# Patient Record
Sex: FEMALE | Race: BLACK OR AFRICAN AMERICAN | NOT HISPANIC OR LATINO | Employment: FULL TIME | URBAN - METROPOLITAN AREA
[De-identification: names, ages, dates, MRNs, and addresses within clinical notes are randomized per-mention and may not be internally consistent; named-entity substitution may affect disease eponyms.]

---

## 2018-07-23 ENCOUNTER — HOSPITAL ENCOUNTER (EMERGENCY)
Facility: HOSPITAL | Age: 23
Discharge: HOME | End: 2018-07-23
Attending: EMERGENCY MEDICINE
Payer: COMMERCIAL

## 2018-07-23 VITALS
HEART RATE: 95 BPM | RESPIRATION RATE: 16 BRPM | TEMPERATURE: 99 F | OXYGEN SATURATION: 100 % | SYSTOLIC BLOOD PRESSURE: 118 MMHG | DIASTOLIC BLOOD PRESSURE: 68 MMHG | BODY MASS INDEX: 24.75 KG/M2 | WEIGHT: 145 LBS | HEIGHT: 64 IN

## 2018-07-23 DIAGNOSIS — R50.9 ACUTE FEBRILE ILLNESS: ICD-10-CM

## 2018-07-23 DIAGNOSIS — B34.9 VIRAL SYNDROME: Primary | ICD-10-CM

## 2018-07-23 LAB
ALBUMIN SERPL BCP-MCNC: 4.3 G/DL
ALP SERPL-CCNC: 63 U/L
ALT SERPL W/O P-5'-P-CCNC: 8 U/L
ANION GAP SERPL CALC-SCNC: 11 MMOL/L
AST SERPL-CCNC: 17 U/L
B-HCG UR QL: NEGATIVE
BASOPHILS # BLD AUTO: 0.02 K/UL
BASOPHILS NFR BLD: 0.3 %
BILIRUB SERPL-MCNC: 0.3 MG/DL
BILIRUB UR QL STRIP: NEGATIVE
BUN SERPL-MCNC: 6 MG/DL
CALCIUM SERPL-MCNC: 9.8 MG/DL
CHLORIDE SERPL-SCNC: 106 MMOL/L
CLARITY UR: CLEAR
CO2 SERPL-SCNC: 19 MMOL/L
COLOR UR: YELLOW
CREAT SERPL-MCNC: 0.9 MG/DL
CTP QC/QA: YES
DIFFERENTIAL METHOD: ABNORMAL
EOSINOPHIL # BLD AUTO: 0.2 K/UL
EOSINOPHIL NFR BLD: 2.4 %
ERYTHROCYTE [DISTWIDTH] IN BLOOD BY AUTOMATED COUNT: 15.7 %
EST. GFR  (AFRICAN AMERICAN): >60 ML/MIN/1.73 M^2
EST. GFR  (NON AFRICAN AMERICAN): >60 ML/MIN/1.73 M^2
GLUCOSE SERPL-MCNC: 82 MG/DL
GLUCOSE UR QL STRIP: NEGATIVE
HCT VFR BLD AUTO: 34 %
HGB BLD-MCNC: 10.9 G/DL
HGB UR QL STRIP: NEGATIVE
KETONES UR QL STRIP: ABNORMAL
LEUKOCYTE ESTERASE UR QL STRIP: NEGATIVE
LYMPHOCYTES # BLD AUTO: 0.4 K/UL
LYMPHOCYTES NFR BLD: 5.7 %
MCH RBC QN AUTO: 22.7 PG
MCHC RBC AUTO-ENTMCNC: 32.1 G/DL
MCV RBC AUTO: 71 FL
MONOCYTES # BLD AUTO: 0.6 K/UL
MONOCYTES NFR BLD: 8.4 %
NEUTROPHILS # BLD AUTO: 6.3 K/UL
NEUTROPHILS NFR BLD: 83.2 %
NITRITE UR QL STRIP: NEGATIVE
PH UR STRIP: 7 [PH] (ref 5–8)
PLATELET # BLD AUTO: 213 K/UL
PMV BLD AUTO: 10.1 FL
POTASSIUM SERPL-SCNC: 3.6 MMOL/L
PROT SERPL-MCNC: 7.4 G/DL
PROT UR QL STRIP: NEGATIVE
RBC # BLD AUTO: 4.8 M/UL
SODIUM SERPL-SCNC: 136 MMOL/L
SP GR UR STRIP: 1.02 (ref 1–1.03)
URN SPEC COLLECT METH UR: ABNORMAL
UROBILINOGEN UR STRIP-ACNC: NEGATIVE EU/DL
WBC # BLD AUTO: 7.58 K/UL

## 2018-07-23 PROCEDURE — 81025 URINE PREGNANCY TEST: CPT | Performed by: PHYSICIAN ASSISTANT

## 2018-07-23 PROCEDURE — 80053 COMPREHEN METABOLIC PANEL: CPT

## 2018-07-23 PROCEDURE — 85025 COMPLETE CBC W/AUTO DIFF WBC: CPT

## 2018-07-23 PROCEDURE — 81003 URINALYSIS AUTO W/O SCOPE: CPT

## 2018-07-23 PROCEDURE — 99284 EMERGENCY DEPT VISIT MOD MDM: CPT | Mod: 25

## 2018-07-23 PROCEDURE — 25000003 PHARM REV CODE 250: Performed by: PHYSICIAN ASSISTANT

## 2018-07-23 PROCEDURE — 96361 HYDRATE IV INFUSION ADD-ON: CPT

## 2018-07-23 PROCEDURE — 87040 BLOOD CULTURE FOR BACTERIA: CPT

## 2018-07-23 PROCEDURE — 63600175 PHARM REV CODE 636 W HCPCS: Performed by: PHYSICIAN ASSISTANT

## 2018-07-23 PROCEDURE — 96374 THER/PROPH/DIAG INJ IV PUSH: CPT

## 2018-07-23 RX ORDER — KETOROLAC TROMETHAMINE 30 MG/ML
15 INJECTION, SOLUTION INTRAMUSCULAR; INTRAVENOUS
Status: COMPLETED | OUTPATIENT
Start: 2018-07-23 | End: 2018-07-23

## 2018-07-23 RX ORDER — ACETAMINOPHEN 500 MG
1000 TABLET ORAL
Status: COMPLETED | OUTPATIENT
Start: 2018-07-23 | End: 2018-07-23

## 2018-07-23 RX ORDER — FLUTICASONE PROPIONATE 50 MCG
1 SPRAY, SUSPENSION (ML) NASAL 2 TIMES DAILY PRN
Qty: 15 G | Refills: 0 | Status: SHIPPED | OUTPATIENT
Start: 2018-07-23 | End: 2018-10-08

## 2018-07-23 RX ORDER — IBUPROFEN 600 MG/1
600 TABLET ORAL EVERY 6 HOURS PRN
Qty: 20 TABLET | Refills: 0 | Status: SHIPPED | OUTPATIENT
Start: 2018-07-23 | End: 2018-10-08

## 2018-07-23 RX ORDER — TRAMADOL HYDROCHLORIDE 50 MG/1
100 TABLET ORAL
Status: COMPLETED | OUTPATIENT
Start: 2018-07-23 | End: 2018-07-23

## 2018-07-23 RX ADMIN — SODIUM CHLORIDE 1000 ML: 0.9 INJECTION, SOLUTION INTRAVENOUS at 02:07

## 2018-07-23 RX ADMIN — KETOROLAC TROMETHAMINE 15 MG: 30 INJECTION, SOLUTION INTRAMUSCULAR; INTRAVENOUS at 04:07

## 2018-07-23 RX ADMIN — ACETAMINOPHEN 1000 MG: 500 TABLET, FILM COATED ORAL at 02:07

## 2018-07-23 RX ADMIN — TRAMADOL HYDROCHLORIDE 100 MG: 50 TABLET, FILM COATED ORAL at 05:07

## 2018-07-23 NOTE — ED PROVIDER NOTES
"Encounter Date: 7/23/2018    SCRIBE #1 NOTE: I, Wendy Tejada, am scribing for, and in the presence of,  Jordon Lopez PA-C. I have scribed the following portions of the note - Other sections scribed: HPI and ROS.       History     Chief Complaint   Patient presents with    Headache     reports headache that began on yesterday, pt stated "I feel like my brain is throbbing." pt stated that the pain runs down her spine and makes her legs numb     CC: Headache    HPI: This 23 y.o female presents to the ED for an evaluation of acute, constant, 10/10 "throbbing" frontal headache that began yesterday.  Patient also reports of back pain, chills, lightheadedness, sore throat, bilateral watery eyes, and a slight cough. Patient reports today, she began having numbness to her bilateral legs. Patient reports within the past 10 minutes, she began having episodes of uncontrollable twitching.  Patient denies fever, chills, nausea, emesis, diarrhea, abdominal pain, chest pain, extremity weakness, rash, or any other associated symptoms.  No prior tx.  No alleviating factors.  No known sick contacts.          The history is provided by the patient. No  was used.     Review of patient's allergies indicates:   Allergen Reactions    Shrimp Other (See Comments)     patient reports itching to throat and tongue     History reviewed. No pertinent past medical history.  History reviewed. No pertinent surgical history.  History reviewed. No pertinent family history.  Social History   Substance Use Topics    Smoking status: Never Smoker    Smokeless tobacco: Not on file    Alcohol use No     Review of Systems   Constitutional: Positive for chills. Negative for fever.   HENT: Positive for sore throat. Negative for ear pain.    Eyes: Negative for pain.        (+) watery eyes   Respiratory: Positive for cough. Negative for shortness of breath.    Cardiovascular: Negative for chest pain.   Gastrointestinal: Negative for " abdominal pain, diarrhea, nausea and vomiting.   Genitourinary: Negative for dysuria.   Musculoskeletal: Positive for back pain.   Skin: Negative for rash.   Neurological: Positive for light-headedness, numbness and headaches.       Physical Exam     Initial Vitals [07/23/18 1250]   BP Pulse Resp Temp SpO2   134/89 (!) 117 19 (!) 100.5 °F (38.1 °C) 96 %      MAP       --         Physical Exam    Vitals reviewed.  Constitutional: She appears well-developed and well-nourished. She is not diaphoretic. No distress.   HENT:   Head: Normocephalic and atraumatic.   Right Ear: Tympanic membrane and external ear normal. No mastoid tenderness.   Left Ear: Tympanic membrane and external ear normal. No mastoid tenderness.   Nose: Mucosal edema present. No rhinorrhea. Right sinus exhibits no maxillary sinus tenderness and no frontal sinus tenderness. Left sinus exhibits no maxillary sinus tenderness and no frontal sinus tenderness.   Mouth/Throat: Uvula is midline, oropharynx is clear and moist and mucous membranes are normal.   Eyes: Conjunctivae and EOM are normal. Pupils are equal, round, and reactive to light. No scleral icterus.   Neck: Normal range of motion and full passive range of motion without pain. Neck supple. No Brudzinski's sign and no Kernig's sign noted.   Cardiovascular: Normal rate, regular rhythm, normal heart sounds and intact distal pulses.   Pulmonary/Chest: Breath sounds normal. No respiratory distress. She has no wheezes. She has no rhonchi. She has no rales. She exhibits no tenderness.   Abdominal: Soft. She exhibits no distension and no mass. There is no tenderness. There is no rebound and no guarding.   Musculoskeletal: Normal range of motion.   Lymphadenopathy:     She has no cervical adenopathy.   Neurological: She is alert and oriented to person, place, and time. She has normal strength and normal reflexes. No cranial nerve deficit or sensory deficit.   Bilateral arms with involuntary  twitching/jerking movements occurring once every 3-5 seconds   Skin: Skin is warm and dry. Capillary refill takes less than 2 seconds. No rash noted. No erythema.         ED Course   Procedures  Labs Reviewed   URINALYSIS, REFLEX TO URINE CULTURE - Abnormal; Notable for the following:        Result Value    Ketones, UA 1+ (*)     All other components within normal limits    Narrative:     Preferred Collection Type->Urine, Clean Catch   CBC W/ AUTO DIFFERENTIAL - Abnormal; Notable for the following:     Hemoglobin 10.9 (*)     Hematocrit 34.0 (*)     MCV 71 (*)     MCH 22.7 (*)     RDW 15.7 (*)     Lymph # 0.4 (*)     Gran% 83.2 (*)     Lymph% 5.7 (*)     All other components within normal limits   COMPREHENSIVE METABOLIC PANEL - Abnormal; Notable for the following:     CO2 19 (*)     ALT 8 (*)     All other components within normal limits   CULTURE, BLOOD   CULTURE, BLOOD   POCT URINE PREGNANCY          Imaging Results          CT Head Without Contrast (Final result)  Result time 07/23/18 15:38:17    Final result by Renaldo Juárez MD (07/23/18 15:38:17)                 Impression:      Normal noncontrast head CT.      Electronically signed by: Renaldo Juárez MD  Date:    07/23/2018  Time:    15:38             Narrative:    EXAMINATION:  CT HEAD WITHOUT CONTRAST    CLINICAL HISTORY:  headache with fever;    TECHNIQUE:  Low dose axial CT images obtained throughout the head without intravenous contrast. Sagittal and coronal reconstructions were performed.    COMPARISON:  None.    FINDINGS:  Intracranial compartment:    Ventricles and sulci are normal in size for age without evidence of hydrocephalus. No extra-axial blood or fluid collections.    The brain parenchyma appears normal. No parenchymal mass, hemorrhage, edema or major vascular distribution infarct.    Skull/extracranial contents (limited evaluation): No fracture. Mastoid air cells and paranasal sinuses are essentially clear.  Visualized portions of the orbits  are within normal limits.                              X-Rays:   Independently Interpreted Readings:   Head CT: No hemorrhage.  No acute stroke.     Medical Decision Making:   ED Management:  Pt with headache, fever, and lower back pain. Neuro exam without focal weakness. She had no changes in mental status. Neck is without meningeal signs and headache is frontal only. Recent URI symptoms preceded headache. CT negative for obvious acute process. Labs are reassuring. UA without infection.  I considered further evaluation with LP, but have low suspicion for meningitis. I discussed the case with Dr. Moon who agrees meningitis is unlikely. No evidence of other serious infection or sepsis. Pt with improved symptoms with IV fluids and tylenol. Pt safe for discharge, and was given strict return precautions, and PCP f/u information for close f/u. She is comfortable with the plan.             Scribe Attestation:   Scribe #1: I performed the above scribed service and the documentation accurately describes the services I performed. I attest to the accuracy of the note.    Attending Attestation:           Physician Attestation for Scribe:  Physician Attestation Statement for Scribe #1: I, Jordon Lopez PA-C, reviewed documentation, as scribed by Wendy Tejada in my presence, and it is both accurate and complete.                    Clinical Impression:   The primary encounter diagnosis was Viral syndrome. A diagnosis of Acute febrile illness was also pertinent to this visit.                             Jordon Lopez PA-C  07/25/18 0032

## 2018-07-23 NOTE — ED NOTES
Pt. Calm and on the phone- pt. States she feel better however she cont to have a headache. Pt. States her pain is 7/10.

## 2018-07-23 NOTE — ED TRIAGE NOTES
Patient presents to the ED via personal vehicle with boyfriend. Patient reports headache x 1 hour, bilateral leg numbness x 1 hour, spastic movements to upper body and arms x 1 hour, and lower and back pain x 1 hour. Patient denies blurred vision, difficulty speaking, tingling to extremities, weakness to extremities, facial drooping.Denies fever, chills.

## 2018-07-28 LAB
BACTERIA BLD CULT: NORMAL
BACTERIA BLD CULT: NORMAL

## 2018-09-07 ENCOUNTER — OFFICE VISIT (OUTPATIENT)
Dept: FAMILY MEDICINE | Facility: CLINIC | Age: 23
End: 2018-09-07
Payer: OTHER GOVERNMENT

## 2018-09-07 ENCOUNTER — LAB VISIT (OUTPATIENT)
Dept: LAB | Facility: HOSPITAL | Age: 23
End: 2018-09-07
Attending: FAMILY MEDICINE
Payer: OTHER GOVERNMENT

## 2018-09-07 VITALS
DIASTOLIC BLOOD PRESSURE: 58 MMHG | HEIGHT: 64 IN | WEIGHT: 146 LBS | HEART RATE: 67 BPM | OXYGEN SATURATION: 99 % | BODY MASS INDEX: 24.92 KG/M2 | TEMPERATURE: 98 F | RESPIRATION RATE: 18 BRPM | SYSTOLIC BLOOD PRESSURE: 100 MMHG

## 2018-09-07 DIAGNOSIS — Z86.2 HX OF IRON DEFICIENCY ANEMIA: ICD-10-CM

## 2018-09-07 DIAGNOSIS — F32.1 CURRENT MODERATE EPISODE OF MAJOR DEPRESSIVE DISORDER WITHOUT PRIOR EPISODE: Primary | ICD-10-CM

## 2018-09-07 LAB
ALBUMIN SERPL BCP-MCNC: 4.2 G/DL
ALP SERPL-CCNC: 75 U/L
ALT SERPL W/O P-5'-P-CCNC: 7 U/L
ANION GAP SERPL CALC-SCNC: 8 MMOL/L
AST SERPL-CCNC: 19 U/L
BASOPHILS # BLD AUTO: 0.05 K/UL
BASOPHILS NFR BLD: 0.6 %
BILIRUB SERPL-MCNC: 0.5 MG/DL
BUN SERPL-MCNC: 8 MG/DL
CALCIUM SERPL-MCNC: 9.9 MG/DL
CHLORIDE SERPL-SCNC: 107 MMOL/L
CHOLEST SERPL-MCNC: 141 MG/DL
CHOLEST/HDLC SERPL: 2 {RATIO}
CO2 SERPL-SCNC: 24 MMOL/L
CREAT SERPL-MCNC: 0.8 MG/DL
DIFFERENTIAL METHOD: ABNORMAL
EOSINOPHIL # BLD AUTO: 0.1 K/UL
EOSINOPHIL NFR BLD: 1.7 %
ERYTHROCYTE [DISTWIDTH] IN BLOOD BY AUTOMATED COUNT: 16.3 %
EST. GFR  (AFRICAN AMERICAN): >60 ML/MIN/1.73 M^2
EST. GFR  (NON AFRICAN AMERICAN): >60 ML/MIN/1.73 M^2
FERRITIN SERPL-MCNC: 5 NG/ML
GLUCOSE SERPL-MCNC: 74 MG/DL
HCT VFR BLD AUTO: 31.4 %
HDLC SERPL-MCNC: 70 MG/DL
HDLC SERPL: 49.6 %
HGB BLD-MCNC: 9.4 G/DL
IMM GRANULOCYTES # BLD AUTO: 0.02 K/UL
IMM GRANULOCYTES NFR BLD AUTO: 0.3 %
IRON SERPL-MCNC: 18 UG/DL
LDLC SERPL CALC-MCNC: 63.8 MG/DL
LYMPHOCYTES # BLD AUTO: 1.6 K/UL
LYMPHOCYTES NFR BLD: 20.9 %
MCH RBC QN AUTO: 21.4 PG
MCHC RBC AUTO-ENTMCNC: 29.9 G/DL
MCV RBC AUTO: 71 FL
MONOCYTES # BLD AUTO: 0.5 K/UL
MONOCYTES NFR BLD: 6.9 %
NEUTROPHILS # BLD AUTO: 5.4 K/UL
NEUTROPHILS NFR BLD: 69.6 %
NONHDLC SERPL-MCNC: 71 MG/DL
NRBC BLD-RTO: 0 /100 WBC
PLATELET # BLD AUTO: 331 K/UL
PMV BLD AUTO: 12.1 FL
POTASSIUM SERPL-SCNC: 4 MMOL/L
PROT SERPL-MCNC: 7.4 G/DL
RBC # BLD AUTO: 4.4 M/UL
SATURATED IRON: 3 %
SODIUM SERPL-SCNC: 139 MMOL/L
TOTAL IRON BINDING CAPACITY: 565 UG/DL
TRANSFERRIN SERPL-MCNC: 382 MG/DL
TRIGL SERPL-MCNC: 36 MG/DL
WBC # BLD AUTO: 7.81 K/UL

## 2018-09-07 PROCEDURE — 80061 LIPID PANEL: CPT

## 2018-09-07 PROCEDURE — 82728 ASSAY OF FERRITIN: CPT

## 2018-09-07 PROCEDURE — 80053 COMPREHEN METABOLIC PANEL: CPT

## 2018-09-07 PROCEDURE — 85025 COMPLETE CBC W/AUTO DIFF WBC: CPT

## 2018-09-07 PROCEDURE — 83540 ASSAY OF IRON: CPT

## 2018-09-07 PROCEDURE — 36415 COLL VENOUS BLD VENIPUNCTURE: CPT | Mod: PO

## 2018-09-07 PROCEDURE — 99999 PR PBB SHADOW E&M-EST. PATIENT-LVL III: CPT | Mod: PBBFAC,,, | Performed by: FAMILY MEDICINE

## 2018-09-07 PROCEDURE — 99204 OFFICE O/P NEW MOD 45 MIN: CPT | Mod: S$PBB,,, | Performed by: FAMILY MEDICINE

## 2018-09-07 PROCEDURE — 99213 OFFICE O/P EST LOW 20 MIN: CPT | Mod: PBBFAC,PO | Performed by: FAMILY MEDICINE

## 2018-09-07 RX ORDER — SERTRALINE HYDROCHLORIDE 25 MG/1
25 TABLET, FILM COATED ORAL DAILY
Qty: 30 TABLET | Refills: 2 | Status: SHIPPED | OUTPATIENT
Start: 2018-09-07 | End: 2018-10-08

## 2018-09-07 NOTE — PROGRESS NOTES
Chief Complaint   Patient presents with    Establish Care    Depression       HPI  Cuong Aguayo is a 23 y.o. female with multiple medical diagnoses as listed in the medical history and problem list that presents for establishment of care . She has a hx of depression, diagnosed two years ago and has been going to therapy but stopped in May. She has been having depression for some time now but has had worsening symptoms since starting pharmacy school. She has had crying spells but these have improved. She has frequent thoughts of death and wishing God would come back. She has spiritual support with her mother but does not feel motivated to read scriptures. She has low energy and feels tired all the time but is not sure if this is due to her low iron. She has five day periods with clots.     Of note her father has bipolar d/o and is not on medication. She gets four to six hours of sleep nightly when she is not studying for a test, but at times sleeping is difficult and she feels restless.     PAST MEDICAL HISTORY:  Past Medical History:   Diagnosis Date    Depression     Iron deficiency anemia        PAST SURGICAL HISTORY:  No past surgical history on file.    SOCIAL HISTORY:  Social History     Socioeconomic History    Marital status: Single     Spouse name: Not on file    Number of children: Not on file    Years of education: Not on file    Highest education level: Not on file   Social Needs    Financial resource strain: Not on file    Food insecurity - worry: Not on file    Food insecurity - inability: Not on file    Transportation needs - medical: Not on file    Transportation needs - non-medical: Not on file   Occupational History    Not on file   Tobacco Use    Smoking status: Never Smoker    Smokeless tobacco: Never Used   Substance and Sexual Activity    Alcohol use: No    Drug use: No    Sexual activity: No   Other Topics Concern    Not on file   Social History Narrative    Not on file        FAMILY HISTORY:  Family History   Problem Relation Age of Onset    Bipolar disorder Father     Kidney failure Father         dialysis    Diabetes type II Father     Hypertension Father        ALLERGIES AND MEDICATIONS: updated and reviewed.  Review of patient's allergies indicates:   Allergen Reactions    Shrimp Other (See Comments)     patient reports itching to throat and tongue     Current Outpatient Medications   Medication Sig Dispense Refill    fluticasone (FLONASE) 50 mcg/actuation nasal spray 1 spray (50 mcg total) by Each Nare route 2 (two) times daily as needed for Rhinitis. 15 g 0    hydrocortisone 1 % cream Apply to affected area 2 times daily 30 g 0    ibuprofen (ADVIL,MOTRIN) 600 MG tablet Take 1 tablet (600 mg total) by mouth every 6 (six) hours as needed for Pain. 20 tablet 0    sertraline (ZOLOFT) 25 MG tablet Take 1 tablet (25 mg total) by mouth once daily. 30 tablet 2     No current facility-administered medications for this visit.        ROS  Review of Systems   Constitutional: Positive for fatigue. Negative for chills, diaphoresis, fever and unexpected weight change.   HENT: Negative for rhinorrhea, sinus pressure, sore throat and tinnitus.    Eyes: Negative for photophobia and visual disturbance.   Respiratory: Negative for cough, shortness of breath and wheezing.    Cardiovascular: Negative for chest pain and palpitations.   Gastrointestinal: Negative for abdominal pain, blood in stool, constipation, diarrhea, nausea and vomiting.   Genitourinary: Negative for dysuria, flank pain, frequency and vaginal discharge.   Musculoskeletal: Negative for arthralgias and joint swelling.   Skin: Negative for rash.   Neurological: Negative for speech difficulty, weakness, light-headedness and headaches.   Psychiatric/Behavioral: Positive for dysphoric mood. Negative for behavioral problems.       Physical Exam  Vitals:    09/07/18 0958   BP: (!) 100/58   Pulse: 67   Resp: 18   Temp: 98.3 °F  "(36.8 °C)   TempSrc: Oral   SpO2: 99%   Weight: 66.2 kg (146 lb)   Height: 5' 4" (1.626 m)    Body mass index is 25.06 kg/m².  Weight: 66.2 kg (146 lb)   Height: 5' 4" (162.6 cm)     Physical Exam   Constitutional: She is oriented to person, place, and time. She appears well-developed and well-nourished. No distress.   HENT:   Head: Normocephalic and atraumatic.   Eyes: EOM are normal.   Neck: Neck supple.   Cardiovascular: Normal rate and regular rhythm. Exam reveals no gallop and no friction rub.   No murmur heard.  Pulmonary/Chest: Effort normal and breath sounds normal. No respiratory distress. She has no wheezes. She has no rales.   Lymphadenopathy:     She has no cervical adenopathy.   Neurological: She is alert and oriented to person, place, and time.   Skin: Skin is warm and dry. No rash noted.   Psychiatric: She has a normal mood and affect. Her behavior is normal.   Nursing note and vitals reviewed.      Health Maintenance    Patient has no pending health maintenance at this time           ASSESSMENT     1. Current moderate episode of major depressive disorder without prior episode    2. Hx of iron deficiency anemia        PLAN:     Problem List Items Addressed This Visit        Other    Current moderate episode of major depressive disorder without prior episode - Primary  -begin zoloft 25mg, half pill for three nights then increase to a whole  -Discussed common side effects including drowsiness and upset stomach, along with any black box warnings if applicable. This drug can alter sleep patterns and mood and the patient will notify me if changes occur.   -will try this first, if she has more restlessness or more trouble sleeping, psych consult for mood d/o eval  -f/o in one month    Relevant Medications    sertraline (ZOLOFT) 25 MG tablet      Other Visit Diagnoses     Hx of iron deficiency anemia      -check iron levels    Relevant Orders    CBC auto differential    Comprehensive metabolic panel    Lipid " panel    Iron and TIBC    Ferritin            Giana Cao MD  09/07/2018 10:48 AM        Follow-up in about 1 month (around 10/7/2018).

## 2018-09-14 ENCOUNTER — TELEPHONE (OUTPATIENT)
Dept: FAMILY MEDICINE | Facility: CLINIC | Age: 23
End: 2018-09-14

## 2018-09-20 ENCOUNTER — PATIENT MESSAGE (OUTPATIENT)
Dept: FAMILY MEDICINE | Facility: CLINIC | Age: 23
End: 2018-09-20

## 2018-10-08 ENCOUNTER — OFFICE VISIT (OUTPATIENT)
Dept: FAMILY MEDICINE | Facility: CLINIC | Age: 23
End: 2018-10-08
Payer: OTHER GOVERNMENT

## 2018-10-08 ENCOUNTER — PATIENT MESSAGE (OUTPATIENT)
Dept: FAMILY MEDICINE | Facility: CLINIC | Age: 23
End: 2018-10-08

## 2018-10-08 VITALS
WEIGHT: 142 LBS | TEMPERATURE: 99 F | BODY MASS INDEX: 24.24 KG/M2 | HEART RATE: 67 BPM | OXYGEN SATURATION: 99 % | HEIGHT: 64 IN | SYSTOLIC BLOOD PRESSURE: 118 MMHG | RESPIRATION RATE: 18 BRPM | DIASTOLIC BLOOD PRESSURE: 76 MMHG

## 2018-10-08 DIAGNOSIS — D50.0 IRON DEFICIENCY ANEMIA DUE TO CHRONIC BLOOD LOSS: ICD-10-CM

## 2018-10-08 DIAGNOSIS — Z23 NEED FOR IMMUNIZATION AGAINST INFLUENZA: ICD-10-CM

## 2018-10-08 DIAGNOSIS — R25.1 TREMOR: ICD-10-CM

## 2018-10-08 DIAGNOSIS — F32.1 CURRENT MODERATE EPISODE OF MAJOR DEPRESSIVE DISORDER WITHOUT PRIOR EPISODE: Primary | ICD-10-CM

## 2018-10-08 PROBLEM — D50.9 IRON DEFICIENCY ANEMIA: Status: ACTIVE | Noted: 2018-10-08

## 2018-10-08 PROCEDURE — 90686 IIV4 VACC NO PRSV 0.5 ML IM: CPT | Mod: PBBFAC,PO | Performed by: FAMILY MEDICINE

## 2018-10-08 PROCEDURE — 99999 PR PBB SHADOW E&M-EST. PATIENT-LVL III: CPT | Mod: PBBFAC,,, | Performed by: FAMILY MEDICINE

## 2018-10-08 PROCEDURE — 99214 OFFICE O/P EST MOD 30 MIN: CPT | Mod: S$GLB,,, | Performed by: FAMILY MEDICINE

## 2018-10-08 PROCEDURE — 99213 OFFICE O/P EST LOW 20 MIN: CPT | Mod: PBBFAC,PO,25 | Performed by: FAMILY MEDICINE

## 2018-10-08 RX ORDER — BUPROPION HYDROCHLORIDE 75 MG/1
75 TABLET ORAL 2 TIMES DAILY
Qty: 60 TABLET | Refills: 2 | Status: SHIPPED | OUTPATIENT
Start: 2018-10-08 | End: 2018-11-16

## 2018-10-08 NOTE — PROGRESS NOTES
Chief Complaint   Patient presents with    Depression    Follow-up       HPI  Cuong Aguayo is a 23 y.o. female with multiple medical diagnoses as listed in the medical history and problem list that presents for follow-up depression and a tremor.    Depression- has had some improvement in her sleeping patterns becoming more regular. She has not felt as depressed but still has had low motivation other than doing what she has to do for school. She has felt more anxious though and had a panic attack during one of her tests    Tremor- she has noted this since she started the zoloft, consistently during the morning when she wakes up but also throughout the day    PAST MEDICAL HISTORY:  Past Medical History:   Diagnosis Date    Depression     Iron deficiency anemia        PAST SURGICAL HISTORY:  No past surgical history on file.    SOCIAL HISTORY:  Social History     Socioeconomic History    Marital status: Single     Spouse name: Not on file    Number of children: Not on file    Years of education: Not on file    Highest education level: Not on file   Social Needs    Financial resource strain: Not on file    Food insecurity - worry: Not on file    Food insecurity - inability: Not on file    Transportation needs - medical: Not on file    Transportation needs - non-medical: Not on file   Occupational History    Not on file   Tobacco Use    Smoking status: Never Smoker    Smokeless tobacco: Never Used   Substance and Sexual Activity    Alcohol use: No    Drug use: No    Sexual activity: No   Other Topics Concern    Not on file   Social History Narrative    Not on file       FAMILY HISTORY:  Family History   Problem Relation Age of Onset    Bipolar disorder Father     Kidney failure Father         dialysis    Diabetes type II Father     Hypertension Father        ALLERGIES AND MEDICATIONS: updated and reviewed.  Review of patient's allergies indicates:   Allergen Reactions    Shrimp Other (See  "Comments)     patient reports itching to throat and tongue     Current Outpatient Medications   Medication Sig Dispense Refill    buPROPion (WELLBUTRIN) 75 MG tablet Take 1 tablet (75 mg total) by mouth 2 (two) times daily. 60 tablet 2     No current facility-administered medications for this visit.        ROS  Review of Systems   Constitutional: Positive for fatigue. Negative for chills, diaphoresis, fever and unexpected weight change.   HENT: Negative for rhinorrhea, sinus pressure, sore throat and tinnitus.    Eyes: Negative for photophobia and visual disturbance.   Respiratory: Negative for cough, shortness of breath and wheezing.    Cardiovascular: Negative for chest pain and palpitations.   Gastrointestinal: Negative for abdominal pain, blood in stool, constipation, diarrhea, nausea and vomiting.   Genitourinary: Negative for dysuria, flank pain, frequency and vaginal discharge.   Musculoskeletal: Negative for arthralgias and joint swelling.   Skin: Negative for rash.   Neurological: Positive for tremors. Negative for speech difficulty, weakness, light-headedness and headaches.   Psychiatric/Behavioral: Positive for dysphoric mood. Negative for behavioral problems.       Physical Exam  Vitals:    10/08/18 1026   BP: 118/76   Pulse: 67   Resp: 18   Temp: 98.6 °F (37 °C)   TempSrc: Oral   SpO2: 99%   Weight: 64.4 kg (142 lb)   Height: 5' 4" (1.626 m)    Body mass index is 24.37 kg/m².  Weight: 64.4 kg (142 lb)   Height: 5' 4" (162.6 cm)     Physical Exam   Constitutional: She is oriented to person, place, and time. She appears well-developed and well-nourished.   Eyes: EOM are normal.   Neurological: She is alert and oriented to person, place, and time.   No intention tremor seen with finger nose, no resting tremor noted   Skin: Skin is warm and dry. No rash noted. No erythema.   Psychiatric: She has a normal mood and affect. Her behavior is normal.   Nursing note and vitals reviewed.      Health Maintenance  "      Date Due Completion Date    TETANUS VACCINE 07/05/2013 ---    Pap Smear 07/05/2016 ---    Influenza Vaccine 08/01/2018 ---            ASSESSMENT     1. Current moderate episode of major depressive disorder without prior episode    2. Iron deficiency anemia due to chronic blood loss    3. Need for immunization against influenza    4. Tremor        PLAN:     Problem List Items Addressed This Visit        Oncology    Iron deficiency anemia  Begin iron replacement daily to BID with stool softener, she does have heavy periods, will recheck levels in 3 mos       Other    Current moderate episode of major depressive disorder without prior episode - Primary  -had a lengthy discussion regarding switching the medication, will stop zoloft because her tremor is likely a side effect and I don't want it to persist  -one pill daily for one week of the wellbutrin, which she may start tomorrow    Relevant Medications    buPROPion (WELLBUTRIN) 75 MG tablet      Other Visit Diagnoses     Need for immunization against influenza        Relevant Orders    Influenza - Quadrivalent (3 years & older) (PF)    Tremor      -d/c zoloft, will try wellbutrin, explained that this may take time to resolve but can persist in some cases            iGana Cao MD  10/08/2018 10:49 AM        Follow-up in about 1 month (around 11/8/2018) for Follow up.

## 2018-10-08 NOTE — PATIENT INSTRUCTIONS
I recommend you begin with fergon (you may get this over the counter) at least once daily for the first week, then increase to twice daily the next week and finally three times daily. Please take with a stool softener such as colace to avoid constipation. Let me know if you are unable to do this.

## 2018-11-06 ENCOUNTER — OFFICE VISIT (OUTPATIENT)
Dept: URGENT CARE | Facility: CLINIC | Age: 23
End: 2018-11-06
Payer: OTHER GOVERNMENT

## 2018-11-06 VITALS
DIASTOLIC BLOOD PRESSURE: 89 MMHG | OXYGEN SATURATION: 99 % | BODY MASS INDEX: 23.73 KG/M2 | WEIGHT: 139 LBS | SYSTOLIC BLOOD PRESSURE: 123 MMHG | HEIGHT: 64 IN | HEART RATE: 64 BPM | TEMPERATURE: 98 F | RESPIRATION RATE: 12 BRPM

## 2018-11-06 DIAGNOSIS — M62.838 CERVICAL PARASPINOUS MUSCLE SPASM: ICD-10-CM

## 2018-11-06 DIAGNOSIS — S16.1XXA STRAIN OF NECK MUSCLE, INITIAL ENCOUNTER: Primary | ICD-10-CM
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
## 2018-11-06 DIAGNOSIS — M54.50 ACUTE BILATERAL LOW BACK PAIN WITHOUT SCIATICA: ICD-10-CM

## 2018-11-06 PROCEDURE — 99214 OFFICE O/P EST MOD 30 MIN: CPT | Mod: S$GLB,,, | Performed by: NURSE PRACTITIONER

## 2018-11-06 RX ORDER — TIZANIDINE 4 MG/1
4 TABLET ORAL DAILY
Qty: 14 TABLET | Refills: 0 | Status: SHIPPED | OUTPATIENT
Start: 2018-11-06 | End: 2018-11-20

## 2018-11-06 RX ORDER — NAPROXEN 500 MG/1
500 TABLET ORAL 2 TIMES DAILY WITH MEALS
Qty: 20 TABLET | Refills: 0 | Status: SHIPPED | OUTPATIENT
Start: 2018-11-06 | End: 2018-11-16
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
## 2018-11-14 ENCOUNTER — PATIENT MESSAGE (OUTPATIENT)
Dept: FAMILY MEDICINE | Facility: CLINIC | Age: 23
End: 2018-11-14

## 2018-11-16 ENCOUNTER — PATIENT OUTREACH (OUTPATIENT)
Dept: ADMINISTRATIVE | Facility: HOSPITAL | Age: 23
End: 2018-11-16

## 2018-11-16 ENCOUNTER — OFFICE VISIT (OUTPATIENT)
Dept: FAMILY MEDICINE | Facility: CLINIC | Age: 23
End: 2018-11-16
Payer: OTHER GOVERNMENT

## 2018-11-16 VITALS
TEMPERATURE: 99 F | SYSTOLIC BLOOD PRESSURE: 100 MMHG | HEIGHT: 64 IN | WEIGHT: 144 LBS | DIASTOLIC BLOOD PRESSURE: 68 MMHG | RESPIRATION RATE: 18 BRPM | OXYGEN SATURATION: 99 % | BODY MASS INDEX: 24.59 KG/M2 | HEART RATE: 59 BPM

## 2018-11-16 DIAGNOSIS — F32.1 CURRENT MODERATE EPISODE OF MAJOR DEPRESSIVE DISORDER WITHOUT PRIOR EPISODE: Primary | ICD-10-CM

## 2018-11-16 PROCEDURE — 99999 PR PBB SHADOW E&M-EST. PATIENT-LVL III: CPT | Mod: PBBFAC,,, | Performed by: FAMILY MEDICINE

## 2018-11-16 PROCEDURE — 99213 OFFICE O/P EST LOW 20 MIN: CPT | Mod: PBBFAC,PO | Performed by: FAMILY MEDICINE

## 2018-11-16 PROCEDURE — 99214 OFFICE O/P EST MOD 30 MIN: CPT | Mod: S$PBB,,, | Performed by: FAMILY MEDICINE

## 2018-11-16 RX ORDER — ESCITALOPRAM OXALATE 5 MG/1
5 TABLET ORAL DAILY
Qty: 30 TABLET | Refills: 1 | Status: SHIPPED | OUTPATIENT
Start: 2018-11-16 | End: 2020-02-11

## 2018-11-16 NOTE — PROGRESS NOTES
Chief Complaint   Patient presents with    Depression       HPI  Cuong Aguayo is a 23 y.o. female with multiple medical diagnoses as listed in the medical history and problem list that presents for follow-up for depression. She reports the wellbutrin causes her to feel sluggish and does not help her moods as much as the zoloft did. She is also struggling with her mother's recent ovarian cancer diagnosis. She has been unmotivated to study. She is struggling to pay attention in class. She has failed her recent round of tests. She is not having thoughts of harm.     PAST MEDICAL HISTORY:  Past Medical History:   Diagnosis Date    Depression     Iron deficiency anemia        PAST SURGICAL HISTORY:  History reviewed. No pertinent surgical history.    SOCIAL HISTORY:  Social History     Socioeconomic History    Marital status: Single     Spouse name: Not on file    Number of children: Not on file    Years of education: Not on file    Highest education level: Not on file   Social Needs    Financial resource strain: Not on file    Food insecurity - worry: Not on file    Food insecurity - inability: Not on file    Transportation needs - medical: Not on file    Transportation needs - non-medical: Not on file   Occupational History    Not on file   Tobacco Use    Smoking status: Never Smoker    Smokeless tobacco: Never Used   Substance and Sexual Activity    Alcohol use: No    Drug use: No    Sexual activity: No   Other Topics Concern    Not on file   Social History Narrative    Not on file       FAMILY HISTORY:  Family History   Problem Relation Age of Onset    Bipolar disorder Father     Kidney failure Father         dialysis    Diabetes type II Father     Hypertension Father        ALLERGIES AND MEDICATIONS: updated and reviewed.  Review of patient's allergies indicates:   Allergen Reactions    Shrimp Other (See Comments)     patient reports itching to throat and tongue     Current Outpatient  "Medications   Medication Sig Dispense Refill    escitalopram oxalate (LEXAPRO) 5 MG Tab Take 1 tablet (5 mg total) by mouth once daily. 30 tablet 1    naproxen (NAPROSYN) 500 MG tablet Take 1 tablet (500 mg total) by mouth 2 (two) times daily with meals. for 10 days 20 tablet 0    tiZANidine (ZANAFLEX) 4 MG tablet Take 1 tablet (4 mg total) by mouth once daily. for 14 days 14 tablet 0     No current facility-administered medications for this visit.        ROS  Review of Systems   Constitutional: Negative for activity change, chills, diaphoresis, fatigue, fever and unexpected weight change.   HENT: Negative for hearing loss, rhinorrhea, sinus pressure, sore throat, tinnitus and trouble swallowing.    Eyes: Negative for photophobia, discharge and visual disturbance.   Respiratory: Negative for cough, chest tightness, shortness of breath and wheezing.    Cardiovascular: Negative for chest pain and palpitations.   Gastrointestinal: Negative for abdominal pain, blood in stool, constipation, diarrhea, nausea and vomiting.   Endocrine: Negative for polydipsia and polyuria.   Genitourinary: Negative for difficulty urinating, dysuria, flank pain, frequency, hematuria, menstrual problem and vaginal discharge.   Musculoskeletal: Negative for arthralgias, joint swelling and neck pain.   Skin: Negative for rash.   Neurological: Negative for speech difficulty, weakness, light-headedness and headaches.   Psychiatric/Behavioral: Positive for dysphoric mood. Negative for behavioral problems and confusion.       Physical Exam  Vitals:    11/16/18 0713   BP: 100/68   Pulse: (!) 59   Resp: 18   Temp: 98.7 °F (37.1 °C)   TempSrc: Oral   SpO2: 99%   Weight: 65.3 kg (144 lb)   Height: 5' 4" (1.626 m)    Body mass index is 24.72 kg/m².  Weight: 65.3 kg (144 lb)   Height: 5' 4" (162.6 cm)     Physical Exam   Constitutional: She is oriented to person, place, and time. She appears well-developed and well-nourished.   Eyes: EOM are normal. "   Neurological: She is alert and oriented to person, place, and time.   Skin: Skin is warm and dry. No rash noted. No erythema.   Psychiatric: Her behavior is normal.   Mood depressed, no SI/HI   Nursing note and vitals reviewed.      Health Maintenance       Date Due Completion Date    Pap Smear 07/05/2016 ---    TETANUS VACCINE 06/22/2026 6/22/2016            ASSESSMENT     1. Current moderate episode of major depressive disorder without prior episode        PLAN:     Problem List Items Addressed This Visit        Other    Current moderate episode of major depressive disorder without prior episode - Primary    Relevant Medications    escitalopram oxalate (LEXAPRO) 5 MG Tab      D/c wellbutrin, may start lexapro tonight  Discussed that she needs to resume counseling as advised on previous visits  She may need some time off to focus on her mental health      Giana Cao MD  11/16/2018 7:18 AM        Follow-up in about 2 weeks (around 11/30/2018) for Follow up.

## 2019-04-28 DIAGNOSIS — F32.1 CURRENT MODERATE EPISODE OF MAJOR DEPRESSIVE DISORDER WITHOUT PRIOR EPISODE: ICD-10-CM

## 2019-04-29 RX ORDER — ESCITALOPRAM OXALATE 5 MG/1
TABLET ORAL
Qty: 30 TABLET | Refills: 0 | OUTPATIENT
Start: 2019-04-29

## 2020-02-11 ENCOUNTER — LAB VISIT (OUTPATIENT)
Dept: LAB | Facility: HOSPITAL | Age: 25
End: 2020-02-11
Attending: FAMILY MEDICINE
Payer: OTHER GOVERNMENT

## 2020-02-11 ENCOUNTER — OFFICE VISIT (OUTPATIENT)
Dept: FAMILY MEDICINE | Facility: CLINIC | Age: 25
End: 2020-02-11
Payer: OTHER GOVERNMENT

## 2020-02-11 VITALS
HEART RATE: 81 BPM | RESPIRATION RATE: 20 BRPM | DIASTOLIC BLOOD PRESSURE: 82 MMHG | TEMPERATURE: 99 F | OXYGEN SATURATION: 100 % | WEIGHT: 150.38 LBS | BODY MASS INDEX: 25.67 KG/M2 | SYSTOLIC BLOOD PRESSURE: 126 MMHG | HEIGHT: 64 IN

## 2020-02-11 DIAGNOSIS — R07.89 CHEST TIGHTNESS: ICD-10-CM

## 2020-02-11 DIAGNOSIS — F41.9 ANXIETY: Primary | ICD-10-CM

## 2020-02-11 DIAGNOSIS — N92.0 MENORRHAGIA WITH REGULAR CYCLE: ICD-10-CM

## 2020-02-11 DIAGNOSIS — D50.0 IRON DEFICIENCY ANEMIA DUE TO CHRONIC BLOOD LOSS: ICD-10-CM

## 2020-02-11 DIAGNOSIS — F32.1 CURRENT MODERATE EPISODE OF MAJOR DEPRESSIVE DISORDER WITHOUT PRIOR EPISODE: ICD-10-CM

## 2020-02-11 DIAGNOSIS — F41.9 ANXIETY: ICD-10-CM

## 2020-02-11 LAB
ALBUMIN SERPL BCP-MCNC: 4.2 G/DL (ref 3.5–5.2)
ALP SERPL-CCNC: 66 U/L (ref 55–135)
ALT SERPL W/O P-5'-P-CCNC: 8 U/L (ref 10–44)
ANION GAP SERPL CALC-SCNC: 9 MMOL/L (ref 8–16)
AST SERPL-CCNC: 17 U/L (ref 10–40)
BASOPHILS # BLD AUTO: 0.04 K/UL (ref 0–0.2)
BASOPHILS NFR BLD: 0.5 % (ref 0–1.9)
BILIRUB SERPL-MCNC: 0.4 MG/DL (ref 0.1–1)
BUN SERPL-MCNC: 7 MG/DL (ref 6–20)
CALCIUM SERPL-MCNC: 9.6 MG/DL (ref 8.7–10.5)
CHLORIDE SERPL-SCNC: 104 MMOL/L (ref 95–110)
CO2 SERPL-SCNC: 24 MMOL/L (ref 23–29)
CREAT SERPL-MCNC: 0.8 MG/DL (ref 0.5–1.4)
DIFFERENTIAL METHOD: ABNORMAL
EOSINOPHIL # BLD AUTO: 0.1 K/UL (ref 0–0.5)
EOSINOPHIL NFR BLD: 0.6 % (ref 0–8)
ERYTHROCYTE [DISTWIDTH] IN BLOOD BY AUTOMATED COUNT: 16.4 % (ref 11.5–14.5)
EST. GFR  (AFRICAN AMERICAN): >60 ML/MIN/1.73 M^2
EST. GFR  (NON AFRICAN AMERICAN): >60 ML/MIN/1.73 M^2
GLUCOSE SERPL-MCNC: 76 MG/DL (ref 70–110)
HCT VFR BLD AUTO: 34.7 % (ref 37–48.5)
HGB BLD-MCNC: 10.4 G/DL (ref 12–16)
IMM GRANULOCYTES # BLD AUTO: 0.02 K/UL (ref 0–0.04)
IMM GRANULOCYTES NFR BLD AUTO: 0.2 % (ref 0–0.5)
IRON SERPL-MCNC: 27 UG/DL (ref 30–160)
LYMPHOCYTES # BLD AUTO: 1.5 K/UL (ref 1–4.8)
LYMPHOCYTES NFR BLD: 18.5 % (ref 18–48)
MCH RBC QN AUTO: 22.6 PG (ref 27–31)
MCHC RBC AUTO-ENTMCNC: 30 G/DL (ref 32–36)
MCV RBC AUTO: 75 FL (ref 82–98)
MONOCYTES # BLD AUTO: 0.6 K/UL (ref 0.3–1)
MONOCYTES NFR BLD: 6.8 % (ref 4–15)
NEUTROPHILS # BLD AUTO: 6.1 K/UL (ref 1.8–7.7)
NEUTROPHILS NFR BLD: 73.4 % (ref 38–73)
NRBC BLD-RTO: 0 /100 WBC
PLATELET # BLD AUTO: 295 K/UL (ref 150–350)
PMV BLD AUTO: 9.8 FL (ref 9.2–12.9)
POTASSIUM SERPL-SCNC: 3.8 MMOL/L (ref 3.5–5.1)
PROT SERPL-MCNC: 7.2 G/DL (ref 6–8.4)
RBC # BLD AUTO: 4.61 M/UL (ref 4–5.4)
SATURATED IRON: 5 % (ref 20–50)
SODIUM SERPL-SCNC: 137 MMOL/L (ref 136–145)
TOTAL IRON BINDING CAPACITY: 542 UG/DL (ref 250–450)
TRANSFERRIN SERPL-MCNC: 366 MG/DL (ref 200–375)
TSH SERPL DL<=0.005 MIU/L-ACNC: 1.62 UIU/ML (ref 0.4–4)
WBC # BLD AUTO: 8.27 K/UL (ref 3.9–12.7)

## 2020-02-11 PROCEDURE — 99999 PR PBB SHADOW E&M-EST. PATIENT-LVL V: CPT | Mod: PBBFAC,,, | Performed by: FAMILY MEDICINE

## 2020-02-11 PROCEDURE — 99214 PR OFFICE/OUTPT VISIT, EST, LEVL IV, 30-39 MIN: ICD-10-PCS | Mod: S$PBB,,, | Performed by: FAMILY MEDICINE

## 2020-02-11 PROCEDURE — 85025 COMPLETE CBC W/AUTO DIFF WBC: CPT

## 2020-02-11 PROCEDURE — 36415 COLL VENOUS BLD VENIPUNCTURE: CPT | Mod: PN

## 2020-02-11 PROCEDURE — 84443 ASSAY THYROID STIM HORMONE: CPT

## 2020-02-11 PROCEDURE — 80053 COMPREHEN METABOLIC PANEL: CPT

## 2020-02-11 PROCEDURE — 99999 PR PBB SHADOW E&M-EST. PATIENT-LVL V: ICD-10-PCS | Mod: PBBFAC,,, | Performed by: FAMILY MEDICINE

## 2020-02-11 PROCEDURE — 99215 OFFICE O/P EST HI 40 MIN: CPT | Mod: PBBFAC,25,PN | Performed by: FAMILY MEDICINE

## 2020-02-11 PROCEDURE — 83540 ASSAY OF IRON: CPT

## 2020-02-11 PROCEDURE — 99214 OFFICE O/P EST MOD 30 MIN: CPT | Mod: S$PBB,,, | Performed by: FAMILY MEDICINE

## 2020-02-11 RX ORDER — FLUOXETINE HYDROCHLORIDE 20 MG/1
20 CAPSULE ORAL DAILY
Qty: 30 CAPSULE | Refills: 5 | Status: SHIPPED | OUTPATIENT
Start: 2020-02-11 | End: 2020-06-17

## 2020-02-11 RX ORDER — ESCITALOPRAM OXALATE 5 MG/1
5 TABLET ORAL
COMMUNITY
Start: 2019-05-17 | End: 2020-02-11

## 2020-02-12 NOTE — PROGRESS NOTES
Subjective:       Patient ID: Cuong Augayo is a 24 y.o. female.    Chief Complaint: Establish Care  24 year old female with depression comes in to establish care with new PCP. She reports that she did not feel comfortable at previous provider's office. She has been treated for depression but has also felt that she gets anxiety, though never previously diagnosed.   Anxiety   Presents for initial visit. Onset was 1 to 5 years ago. The problem has been gradually worsening. Symptoms include chest pain, decreased concentration, depressed mood, excessive worry, muscle tension, nervous/anxious behavior, palpitations and shortness of breath. Patient reports no compulsions, confusion, dizziness, dry mouth, feeling of choking, hyperventilation, insomnia, panic, restlessness or suicidal ideas. Symptoms occur most days. The severity of symptoms is interfering with daily activities. The symptoms are aggravated by social activities and work stress. The quality of sleep is fair. Nighttime awakenings: occasional.     Her past medical history is significant for anemia and depression. Treatments tried: Lexapro, Sertraline. The treatment provided no relief.   Depression   Visit Type: follow-up  Patient presents with the following symptoms: decreased concentration, depressed mood, excessive worry, fatigue, muscle tension, nervousness/anxiety, palpitations and shortness of breath.  Patient is not experiencing: anhedonia, compulsions, confusion, dry mouth, feelings of worthlessness, hypersomnia, hyperventilation, insomnia, panic, restlessness, suicidal ideas, suicidal planning, thoughts of death, weight gain and weight loss.  Frequency of symptoms: most days   Severity: interfering with daily activities   Sleep quality: fair  Compliance with medications:  %        Anemia   Presents for initial visit. Onset time: several years. Symptoms include palpitations. There has been no confusion, light-headedness or weight loss. Signs of  "blood loss that are present include vaginal bleeding. Past treatments include oral iron supplements. There is no history of alcohol abuse, hemoglobinopathy, hypothyroidism, malnutrition, neuropathy, recent illness, recent surgery or recent trauma. Compliance with medications: poor.    States that her menstrual periods come every 4 weeks but are very heavy with clots. Declines Pap and transvaginal US as she has never been sexually active.    Review of Systems   Constitutional: Negative for weight gain and weight loss.   Respiratory: Positive for shortness of breath.    Cardiovascular: Positive for chest pain and palpitations.   Genitourinary: Positive for vaginal bleeding.   Neurological: Negative for dizziness and light-headedness.   Psychiatric/Behavioral: Positive for decreased concentration and depression. Negative for confusion and suicidal ideas. The patient is nervous/anxious. The patient does not have insomnia.        Objective:     /82 (BP Location: Left arm, Patient Position: Sitting, BP Method: Medium (Automatic))   Pulse 81   Temp 99 °F (37.2 °C) (Oral)   Resp 20   Ht 5' 4" (1.626 m)   Wt 68.2 kg (150 lb 5.7 oz)   SpO2 100%   BMI 25.81 kg/m²     Physical Exam   Constitutional: She is oriented to person, place, and time. She appears well-developed and well-nourished. No distress.   HENT:   Head: Normocephalic and atraumatic.   Right Ear: Tympanic membrane, external ear and ear canal normal.   Left Ear: Tympanic membrane, external ear and ear canal normal.   Nose: Nose normal.   Mouth/Throat: Oropharynx is clear and moist.   Eyes: Pupils are equal, round, and reactive to light. Conjunctivae and EOM are normal. Right eye exhibits no discharge. Left eye exhibits no discharge.   Neck: Normal range of motion. Neck supple. No tracheal deviation present. No thyromegaly present.   Cardiovascular: Normal rate, regular rhythm, S1 normal, S2 normal, normal heart sounds and intact distal pulses.   No " murmur heard.  Pulses:       Radial pulses are 2+ on the right side, and 2+ on the left side.   Pulmonary/Chest: Effort normal and breath sounds normal. No respiratory distress. She has no wheezes. She has no rhonchi. She has no rales.   Abdominal: Soft. Bowel sounds are normal. She exhibits no distension and no mass. There is no tenderness. There is no rigidity, no guarding and no CVA tenderness.   Lymphadenopathy:     She has no cervical adenopathy.   Neurological: She is alert and oriented to person, place, and time. She has normal strength. She displays no atrophy. No cranial nerve deficit or sensory deficit. She exhibits normal muscle tone.   Reflex Scores:       Patellar reflexes are 2+ on the right side and 2+ on the left side.  Skin: Skin is warm and dry. Capillary refill takes less than 2 seconds. No rash noted. She is not diaphoretic.   Psychiatric: She has a normal mood and affect. Her behavior is normal.   Vitals reviewed.      Assessment:       1. Anxiety    2. Current moderate episode of major depressive disorder without prior episode    3. Chest tightness    4. Iron deficiency anemia due to chronic blood loss    5. Menorrhagia with regular cycle        Plan:       Cuong was seen today for establish care.    Diagnoses and all orders for this visit:    Anxiety  -     Comprehensive metabolic panel; Future  -     TSH; Future  -     FLUoxetine (PROZAC) 20 MG capsule; Take 1 capsule (20 mg total) by mouth once daily.  -     Ambulatory referral/consult to Psychiatry; Future  Management of anxiety and depression discussed.   As she has had poor response with Lexapro and Sertraline as well as Wellbutrin for depression, will adjust medication to Prozac.   Side effects discussed.  Follow up in 2-4 weeks.  Advised psychiatry consult.  Discussed possible risks with pregnancy. Patient not sexually active.    Current moderate episode of major depressive disorder without prior episode  -     Comprehensive metabolic  panel; Future  -     TSH; Future  -     FLUoxetine (PROZAC) 20 MG capsule; Take 1 capsule (20 mg total) by mouth once daily.  -     Ambulatory referral/consult to Psychiatry; Future  As above    Chest tightness  -     Comprehensive metabolic panel; Future  -     TSH; Future  -     IN OFFICE EKG 12-LEAD (to [facility])  -     X-Ray Chest PA And Lateral; Future  Likely from anxiety and depression.  However, will check CXR and EKG.    Iron deficiency anemia due to chronic blood loss  -     Comprehensive metabolic panel; Future  -     CBC auto differential; Future  -     Iron and TIBC; Future  Check anemia panel    Menorrhagia with regular cycle  -     US Pelvis Complete Non OB; Future  Evaluate for fibroid.

## 2020-02-18 ENCOUNTER — HOSPITAL ENCOUNTER (OUTPATIENT)
Dept: RADIOLOGY | Facility: HOSPITAL | Age: 25
Discharge: HOME | End: 2020-02-18
Attending: FAMILY MEDICINE
Payer: OTHER GOVERNMENT

## 2020-02-18 DIAGNOSIS — N92.0 MENORRHAGIA WITH REGULAR CYCLE: ICD-10-CM

## 2020-02-18 PROCEDURE — 76856 US EXAM PELVIC COMPLETE: CPT | Mod: TC

## 2020-02-18 PROCEDURE — 76856 US EXAM PELVIC COMPLETE: CPT | Mod: 26,,, | Performed by: RADIOLOGY

## 2020-02-18 PROCEDURE — 76856 US PELVIS COMPLETE NON OB: ICD-10-PCS | Mod: 26,,, | Performed by: RADIOLOGY

## 2020-03-09 ENCOUNTER — OFFICE VISIT (OUTPATIENT)
Dept: FAMILY MEDICINE | Facility: CLINIC | Age: 25
End: 2020-03-09
Payer: OTHER GOVERNMENT

## 2020-03-09 VITALS
RESPIRATION RATE: 18 BRPM | OXYGEN SATURATION: 98 % | DIASTOLIC BLOOD PRESSURE: 62 MMHG | BODY MASS INDEX: 25.96 KG/M2 | SYSTOLIC BLOOD PRESSURE: 112 MMHG | WEIGHT: 151.25 LBS | HEART RATE: 69 BPM | TEMPERATURE: 98 F

## 2020-03-09 DIAGNOSIS — D25.9 UTERINE LEIOMYOMA, UNSPECIFIED LOCATION: ICD-10-CM

## 2020-03-09 DIAGNOSIS — F32.1 CURRENT MODERATE EPISODE OF MAJOR DEPRESSIVE DISORDER WITHOUT PRIOR EPISODE: ICD-10-CM

## 2020-03-09 DIAGNOSIS — F41.9 ANXIETY: Primary | ICD-10-CM

## 2020-03-09 DIAGNOSIS — D50.0 IRON DEFICIENCY ANEMIA DUE TO CHRONIC BLOOD LOSS: ICD-10-CM

## 2020-03-09 PROCEDURE — 99213 OFFICE O/P EST LOW 20 MIN: CPT | Mod: PBBFAC,PN | Performed by: FAMILY MEDICINE

## 2020-03-09 PROCEDURE — 99214 PR OFFICE/OUTPT VISIT, EST, LEVL IV, 30-39 MIN: ICD-10-PCS | Mod: S$PBB,,, | Performed by: FAMILY MEDICINE

## 2020-03-09 PROCEDURE — 99214 OFFICE O/P EST MOD 30 MIN: CPT | Mod: S$PBB,,, | Performed by: FAMILY MEDICINE

## 2020-03-09 PROCEDURE — 99999 PR PBB SHADOW E&M-EST. PATIENT-LVL III: CPT | Mod: PBBFAC,,, | Performed by: FAMILY MEDICINE

## 2020-03-09 PROCEDURE — 99999 PR PBB SHADOW E&M-EST. PATIENT-LVL III: ICD-10-PCS | Mod: PBBFAC,,, | Performed by: FAMILY MEDICINE

## 2020-03-20 NOTE — PROGRESS NOTES
Subjective:       Patient ID: Cuong Aguayo is a 24 y.o. female.    Chief Complaint: Anxiety and Depression    HPI   24 year old female comes in for follow up on anxiety and depression since starting Prozac 4 weeks ago. She reports that both are much better, she has improved concentration, and able to sleep. No palpitations, or shortness of breath. No chest tightness. Reports no suicidal thoughts. She is agreeable on continuing the prozac.  Patient also had an US done for evaluation of heavy periods.     Review of Systems   Constitutional: Negative for activity change and unexpected weight change.   HENT: Negative for hearing loss and trouble swallowing.    Eyes: Negative for discharge and visual disturbance.   Respiratory: Negative for wheezing.    Gastrointestinal: Negative for blood in stool, constipation, diarrhea and vomiting.   Endocrine: Negative for polydipsia and polyuria.   Genitourinary: Negative for difficulty urinating, dysuria, hematuria and menstrual problem.   Musculoskeletal: Negative for arthralgias, joint swelling and neck pain.   Neurological: Negative for weakness.   Psychiatric/Behavioral: Negative for confusion and dysphoric mood.       Objective:     /62 (BP Location: Left arm, Patient Position: Sitting, BP Method: Medium (Manual))   Pulse 69   Temp 98.3 °F (36.8 °C) (Oral)   Resp 18   Wt 68.6 kg (151 lb 3.8 oz)   SpO2 98%   BMI 25.96 kg/m²     Physical Exam   Constitutional: She is oriented to person, place, and time. She appears well-developed and well-nourished.   HENT:   Head: Normocephalic and atraumatic.   Right Ear: External ear normal.   Left Ear: External ear normal.   Nose: Nose normal.   Mouth/Throat: Oropharynx is clear and moist. No oropharyngeal exudate.   Eyes: Pupils are equal, round, and reactive to light. Conjunctivae and EOM are normal. Right eye exhibits no discharge. Left eye exhibits no discharge.   Neck: Normal range of motion. Neck supple. No tracheal  deviation present.   Cardiovascular: Normal rate, regular rhythm, normal heart sounds and intact distal pulses.   No murmur heard.  Pulmonary/Chest: Effort normal and breath sounds normal. She has no wheezes. She has no rales.   Abdominal: Soft. Bowel sounds are normal. She exhibits no mass. There is no tenderness. There is no rigidity, no guarding and no CVA tenderness.   Lymphadenopathy:     She has no cervical adenopathy.   Neurological: She is oriented to person, place, and time. She has normal strength. She displays no atrophy. No sensory deficit. Gait normal.   Reflex Scores:       Patellar reflexes are 2+ on the right side and 2+ on the left side.  Psychiatric: She has a normal mood and affect. Her mood appears not anxious. Her speech is not rapid and/or pressured and not delayed. Thought content is not paranoid. She does not exhibit a depressed mood. She expresses no homicidal and no suicidal ideation.   Vitals reviewed.      Lab Visit on 02/11/2020   Component Date Value Ref Range Status    Sodium 02/11/2020 137  136 - 145 mmol/L Final    Potassium 02/11/2020 3.8  3.5 - 5.1 mmol/L Final    Chloride 02/11/2020 104  95 - 110 mmol/L Final    CO2 02/11/2020 24  23 - 29 mmol/L Final    Glucose 02/11/2020 76  70 - 110 mg/dL Final    BUN, Bld 02/11/2020 7  6 - 20 mg/dL Final    Creatinine 02/11/2020 0.8  0.5 - 1.4 mg/dL Final    Calcium 02/11/2020 9.6  8.7 - 10.5 mg/dL Final    Total Protein 02/11/2020 7.2  6.0 - 8.4 g/dL Final    Albumin 02/11/2020 4.2  3.5 - 5.2 g/dL Final    Total Bilirubin 02/11/2020 0.4  0.1 - 1.0 mg/dL Final    Comment: For infants and newborns, interpretation of results should be based  on gestational age, weight and in agreement with clinical  observations.  Premature Infant recommended reference ranges:  Up to 24 hours.............<8.0 mg/dL  Up to 48 hours............<12.0 mg/dL  3-5 days..................<15.0 mg/dL  6-29 days.................<15.0 mg/dL      Alkaline  Phosphatase 02/11/2020 66  55 - 135 U/L Final    AST 02/11/2020 17  10 - 40 U/L Final    ALT 02/11/2020 8* 10 - 44 U/L Final    Anion Gap 02/11/2020 9  8 - 16 mmol/L Final    eGFR if African American 02/11/2020 >60  >60 mL/min/1.73 m^2 Final    eGFR if non African American 02/11/2020 >60  >60 mL/min/1.73 m^2 Final    Comment: Calculation used to obtain the estimated glomerular filtration  rate (eGFR) is the CKD-EPI equation.       WBC 02/11/2020 8.27  3.90 - 12.70 K/uL Final    RBC 02/11/2020 4.61  4.00 - 5.40 M/uL Final    Hemoglobin 02/11/2020 10.4* 12.0 - 16.0 g/dL Final    Hematocrit 02/11/2020 34.7* 37.0 - 48.5 % Final    Mean Corpuscular Volume 02/11/2020 75* 82 - 98 fL Final    Mean Corpuscular Hemoglobin 02/11/2020 22.6* 27.0 - 31.0 pg Final    Mean Corpuscular Hemoglobin Conc 02/11/2020 30.0* 32.0 - 36.0 g/dL Final    RDW 02/11/2020 16.4* 11.5 - 14.5 % Final    Platelets 02/11/2020 295  150 - 350 K/uL Final    MPV 02/11/2020 9.8  9.2 - 12.9 fL Final    Immature Granulocytes 02/11/2020 0.2  0.0 - 0.5 % Final    Gran # (ANC) 02/11/2020 6.1  1.8 - 7.7 K/uL Final    Immature Grans (Abs) 02/11/2020 0.02  0.00 - 0.04 K/uL Final    Comment: Mild elevation in immature granulocytes is non specific and   can be seen in a variety of conditions including stress response,   acute inflammation, trauma and pregnancy. Correlation with other   laboratory and clinical findings is essential.      Lymph # 02/11/2020 1.5  1.0 - 4.8 K/uL Final    Mono # 02/11/2020 0.6  0.3 - 1.0 K/uL Final    Eos # 02/11/2020 0.1  0.0 - 0.5 K/uL Final    Baso # 02/11/2020 0.04  0.00 - 0.20 K/uL Final    nRBC 02/11/2020 0  0 /100 WBC Final    Gran% 02/11/2020 73.4* 38.0 - 73.0 % Final    Lymph% 02/11/2020 18.5  18.0 - 48.0 % Final    Mono% 02/11/2020 6.8  4.0 - 15.0 % Final    Eosinophil% 02/11/2020 0.6  0.0 - 8.0 % Final    Basophil% 02/11/2020 0.5  0.0 - 1.9 % Final    Differential Method 02/11/2020 Automated    Final    TSH 02/11/2020 1.618  0.400 - 4.000 uIU/mL Final    Iron 02/11/2020 27* 30 - 160 ug/dL Final    Transferrin 02/11/2020 366  200 - 375 mg/dL Final    TIBC 02/11/2020 542* 250 - 450 ug/dL Final    Saturated Iron 02/11/2020 5* 20 - 50 % Final     Narrative     EXAMINATION:  US PELVIS COMPLETE NON OB    CLINICAL HISTORY:  Excessive and frequent menstruation with regular cycle    TECHNIQUE:  Transabdominal examination only performed.    COMPARISON:  None    FINDINGS:  Uterus measures 14.3 x 6.4 x 7.6 cm.  Endometrial stripe 14 mm within normal limits for age.  Fibroid identified level of the fundal anterior region 6.1 x 5.5 x 7.1 cm.    RIGHT ovary measures 3.4 x 2.3 x 3.5 cm.    LEFT ovary measures 6.9 x 3.8 x 4.0 cm.  Complex region LEFT ovary 3.5 x 3.2 x 2.9 cm may be that of corpus luteum.  Trace free fluid.      Impression       Transabdominal examination demonstrating 6 cm uterine fundal fibroid.  Complex region LEFT ovary likely corpus luteum.      Electronically signed by: David Brooks MD  Date: 02/18/2020  Time: 15:57       Assessment:       1. Anxiety    2. Current moderate episode of major depressive disorder without prior episode    3. Iron deficiency anemia due to chronic blood loss    4. Uterine leiomyoma, unspecified location        Plan:       Cuong was seen today for anxiety and depression.    Diagnoses and all orders for this visit:    Anxiety  Patient doing much better with Prozac.  Will continue  Once again discussed not stopping medication without following up in office.   Advised patient to call office if any worsening prior to next appointment    Current moderate episode of major depressive disorder without prior episode  Patient doing much better with Prozac.  Will continue  Once again discussed not stopping medication without following up in office.   Advised patient to call office if any worsening prior to next appointment    Iron deficiency anemia due to chronic blood  loss  Patient to continue a daily iron.    Uterine leiomyoma, unspecified location  -     Ambulatory referral/consult to Gynecology; Future  Will refer to GYN

## 2020-04-20 ENCOUNTER — PATIENT MESSAGE (OUTPATIENT)
Dept: FAMILY MEDICINE | Facility: CLINIC | Age: 25
End: 2020-04-20

## 2020-04-22 ENCOUNTER — PATIENT MESSAGE (OUTPATIENT)
Dept: FAMILY MEDICINE | Facility: CLINIC | Age: 25
End: 2020-04-22

## 2020-05-06 ENCOUNTER — PATIENT MESSAGE (OUTPATIENT)
Dept: FAMILY MEDICINE | Facility: CLINIC | Age: 25
End: 2020-05-06

## 2020-05-06 DIAGNOSIS — F32.1 CURRENT MODERATE EPISODE OF MAJOR DEPRESSIVE DISORDER WITHOUT PRIOR EPISODE: Primary | ICD-10-CM

## 2020-05-06 DIAGNOSIS — F41.9 ANXIETY: ICD-10-CM

## 2020-05-13 ENCOUNTER — TELEPHONE (OUTPATIENT)
Dept: OBSTETRICS AND GYNECOLOGY | Facility: CLINIC | Age: 25
End: 2020-05-13

## 2020-05-13 NOTE — TELEPHONE ENCOUNTER
----- Message from Joycelyn Anderson sent at 5/13/2020  2:14 PM CDT -----  Contact: Patient   Type: Patient Call Back    Who called: Patient     What is the request in detail: Pt is requesting a call back in regards to her appointment today. Pt cycle is down and wants to know if she could still be sent today.     Can the clinic reply by [facility]?    Would the patient rather a call back or a response via [facility]? Call back     Best call back number: [phone]      Patient r/s her new pt appt today due to her cycle, she will be seen on 5/22/2020

## 2020-05-21 ENCOUNTER — PATIENT MESSAGE (OUTPATIENT)
Dept: OBSTETRICS AND GYNECOLOGY | Facility: CLINIC | Age: 25
End: 2020-05-21

## 2020-05-22 ENCOUNTER — OFFICE VISIT (OUTPATIENT)
Dept: OBSTETRICS AND GYNECOLOGY | Facility: CLINIC | Age: 25
End: 2020-05-22
Payer: OTHER GOVERNMENT

## 2020-05-22 VITALS
HEIGHT: 64 IN | WEIGHT: 147.94 LBS | BODY MASS INDEX: 25.25 KG/M2 | SYSTOLIC BLOOD PRESSURE: 107 MMHG | DIASTOLIC BLOOD PRESSURE: 65 MMHG

## 2020-05-22 DIAGNOSIS — N93.9 ABNORMAL UTERINE BLEEDING (AUB): Primary | ICD-10-CM

## 2020-05-22 PROCEDURE — 99999 PR PBB SHADOW E&M-EST. PATIENT-LVL III: ICD-10-PCS | Mod: PBBFAC,,, | Performed by: OBSTETRICS & GYNECOLOGY

## 2020-05-22 PROCEDURE — 99213 OFFICE O/P EST LOW 20 MIN: CPT | Mod: PBBFAC | Performed by: OBSTETRICS & GYNECOLOGY

## 2020-05-22 PROCEDURE — 99204 OFFICE O/P NEW MOD 45 MIN: CPT | Mod: S$PBB,,, | Performed by: OBSTETRICS & GYNECOLOGY

## 2020-05-22 PROCEDURE — 99999 PR PBB SHADOW E&M-EST. PATIENT-LVL III: CPT | Mod: PBBFAC,,, | Performed by: OBSTETRICS & GYNECOLOGY

## 2020-05-22 PROCEDURE — 99204 PR OFFICE/OUTPT VISIT, NEW, LEVL IV, 45-59 MIN: ICD-10-PCS | Mod: S$PBB,,, | Performed by: OBSTETRICS & GYNECOLOGY

## 2020-05-22 RX ORDER — DROSPIRENONE AND ETHINYL ESTRADIOL 0.02-3(28)
1 KIT ORAL DAILY
Qty: 90 TABLET | Refills: 3 | Status: SHIPPED | OUTPATIENT
Start: 2020-05-22 | End: 2022-02-23

## 2020-05-22 NOTE — PROGRESS NOTES
History & Physical  Gynecology      SUBJECTIVE:     Chief Complaint: Fibroids (2/18/2020 U/S)       History of Present Illness:  Ms. Aguayo is a 24 y/of emale who presents to clinic for AUB. She reports that her periods are monthly lasting 5-8 days with the first 5 days being heavy bleeding. She reports using approximately 5 pads on her heaviest. She reports blood clots whether its a multiple small blood clots or just a few smaller ones. She is anemic and on iron likely due to her heavy periods. SHe reports severe pain during her periods as well. She has neever been on birth control to help control her periods.      FINDINGS:  Uterus measures 14.3 x 6.4 x 7.6 cm.  Endometrial stripe 14 mm within normal limits for age.  Fibroid identified level of the fundal anterior region 6.1 x 5.5 x 7.1 cm.    RIGHT ovary measures 3.4 x 2.3 x 3.5 cm.    LEFT ovary measures 6.9 x 3.8 x 4.0 cm.  Complex region LEFT ovary 3.5 x 3.2 x 2.9 cm may be that of corpus luteum.  Trace free fluid.      Impression       Transabdominal examination demonstrating 6 cm uterine fundal fibroid.  Complex region LEFT ovary likely corpus luteum       Review of patient's allergies indicates:   Allergen Reactions    Shrimp Other (See Comments) and Hives     patient reports itching to throat and tongue       Past Medical History:   Diagnosis Date    Depression     Iron deficiency anemia      History reviewed. No pertinent surgical history.  OB History    None       Family History   Problem Relation Age of Onset    Bipolar disorder Father     Kidney failure Father         dialysis    Diabetes type II Father     Hypertension Father      Social History     Tobacco Use    Smoking status: Never Smoker    Smokeless tobacco: Never Used   Substance Use Topics    Alcohol use: No    Drug use: No       Current Outpatient Medications   Medication Sig    drospirenone-ethinyl estradioL (IRAIS) 3-0.02 mg per tablet Take 1 tablet by mouth once daily.    ferrous  sulfate (SLOW RELEASE IRON) 142 mg (45 mg iron) TbSR Take 45 mg by mouth.    FLUoxetine (PROZAC) 20 MG capsule Take 1 capsule (20 mg total) by mouth once daily.     No current facility-administered medications for this visit.          Review of Systems:  Review of Systems   Constitutional: Negative for chills and fever.   Eyes: Negative for visual disturbance.   Respiratory: Negative for cough and wheezing.    Cardiovascular: Negative for chest pain and palpitations.   Gastrointestinal: Negative for abdominal pain, nausea and vomiting.   Genitourinary: Positive for dysmenorrhea, menorrhagia and menstrual problem. Negative for dysuria, frequency, hematuria, pelvic pain, vaginal discharge and vaginal pain.   Neurological: Negative for headaches.   Psychiatric/Behavioral: Negative for depression.        OBJECTIVE:     Physical Exam:  Physical Exam   Constitutional: She is oriented to person, place, and time. She appears well-developed and well-nourished.   Cardiovascular: Normal rate.   Pulmonary/Chest: Effort normal. No respiratory distress.   Abdominal: Soft. She exhibits no distension. There is no tenderness.   Genitourinary: Pelvic exam was performed with patient supine.   Genitourinary Comments: Not performed. Patient unable to tolerate   Neurological: She is oriented to person, place, and time.   Skin: Skin is warm and dry.   Psychiatric: She has a normal mood and affect.   Nursing note and vitals reviewed.      ASSESSMENT:       ICD-10-CM ICD-9-CM    1. Abnormal uterine bleeding (AUB) N93.9 626.9 POCT urine pregnancy      drospirenone-ethinyl estradioL (IRAIS) 3-0.02 mg per tablet      CANCELED: Liquid-Based Pap Smear, Screening          Plan:      Cuong was seen today for fibroids.    Diagnoses and all orders for this visit:    Abnormal uterine bleeding (AUB)  -     POCT urine pregnancy  -     drospirenone-ethinyl estradioL (IRAIS) 3-0.02 mg per tablet; Take 1 tablet by mouth once daily.  - Discussed with patient  how uterine fibroids can distort uterine architecture and contractile function increasing the risk of heavy bleeding and abnormal periods  -    Reviewed  TVUS or tissue diagnosis.   - We discussed the various treatment options: 1) No treatment  2) Medical treatment (NSAIDS, OCPs, Provera, Mirena IUD, Lysteda)  3) Surgical tretament: myomectomy vs. hysterectomy.   - The patient expressed understanding of her treatment options and all questions were answered. After informed counseling, she has decided to proceed with OCPs. During the consultation session all of her questions were answered.      Orders Placed This Encounter   Procedures    POCT urine pregnancy       Follow up in about 3 months (around 8/22/2020) for follow AUB.    Counseling time: 30 minutes    Efren Rausch

## 2020-05-22 NOTE — PATIENT INSTRUCTIONS
Birth Control Methods  Birth control methods are used to help prevent pregnancy. There are many different methods to choose from. Talk to your healthcare provider about which method is right for you. Be sure to ask your provider about the effectiveness of each method. Also ask about the benefits, risks, and side effects of each method.  Hormones  Some birth control methods work by releasing hormones such as progestin and estrogen. These methods include: hormone implants, hormone shots, the vaginal ring, the patch, and birth control pills. They all work by stopping ovulation (release of the egg from the ovary). The implant is a small device that needs to be placed in the upper arm by a trained healthcare provider. It works for up to 3 years. Hormone injections must be repeated every 3 months. The vaginal ring must be replaced monthly (it can be removed during the fourth week of each cycle). The patch must be replaced weekly (it is not worn during the fourth week of each cycle). Birth control pills must be taken every day. Note that all of these methods are effective and can be stopped at any time.  Intrauterine Device (IUD)  An IUD is a small, T-shaped device. It must be placed in the uterus by a trained healthcare provider. There are different types of IUDs available. They work by causing changes in the uterus that make it harder for sperm to reach the egg. Depending on the type of IUD you have, it may work for several years or longer. The IUD is a reversible birth control method. This means it can be removed at any time.  Condom  A condom is a sheath that forms a thin barrier between the penis and the vagina. It helps prevent pregnancy by keeping sperm from entering the vagina. When latex condoms are used, they have the added benefit of protecting against most STDs (sexually transmitted diseases). Condoms should be discarded after each use. Ask your healthcare provider about the different types of condoms  available. These include both the male condom and female condom.  Spermicide  Spermicides come as foams, jellies, creams, suppositories, and tablets.  They help prevent pregnancy by killing sperm. When used alone they are not that reliable. They work best when combined with other birth control methods such as diaphragms and cervical caps.  Sponge, Diaphragm, and Cervical Cap  All of these methods help prevent pregnancy by covering the opening of the uterus (cervix). This prevents sperm from passing through.  The sponge contains spermicide. It can be bought over the counter. The sponge must be left in place for at least 6 hours after the last time you have sex. However, it should not stay in place for more than 24 hours. It should be discarded after it is used.  The diaphragm and cervical cap must be fitted and prescribed by your healthcare provider. Both are used with spermicide. The diaphragm must be left in place for at least 6 hours after sex. However, it should not stay in place for more than 24 hours. It can be washed and reused. The cervical cap must be left in place for at least 6 hours after sex. However, it should not stay in place for more than 48 hours. It can be washed and reused.  Withdrawal Method  This is when the man pulls his penis out of the vagina just before ejaculation (coming). This lowers the amount of sperm entering the vagina. Be aware that fluids released just before ejaculation often still contain some sperm, so this method is not as reliable as certain other methods.  Rhythm Method  This method requires that you know when in your menstrual cycle you are likely to become pregnant. Then, you avoid sex during those days. This requires careful planning and good discipline. Your healthcare provider can explain more about how this works.  Tubal Ligation and Vasectomy  These are surgical methods to prevent pregnancy. Tubal ligation is an option for women. The fallopian tubes are blocked or cut  (ligated). This keeps the egg from passing into the uterus or sperm from reaching the egg. Vasectomy is an option for men. The tubes that normally carry sperm to the penis are either closed or blocked. Both tubal ligation and vasectomy are permanent both control methods. This means reversal is either not possible or unlikely to work. They are good choices for women and men who know that they do not want to have children in the future.  Date Last Reviewed: 6/11/2015 © 2000-2017 [employer]. [address] [place] [address]. All rights reserved. This information is not intended as a substitute for professional medical care. Always follow your healthcare professional's instructions.        Discussed with patient the importance of taking her OCP everyday. She understands to start pills after her next period. She understands that if she skips one pill that she should take it as soon as possible but if she skips 2 pills she should resume pills as soon as possible and use condoms/abstience for the following 7 days. If she missed >2 doses than she should stop taking pills, have a period then start a new pack.        Myomectomy  Myomectomy is a surgical procedure to remove uterine fibroids. This procedure may preserve your uterus and your ability to have children.  Before your surgery  Depending on which procedure you and your healthcare provider choose, you may be asked to do the following:  · Have tests that your health care provider has ordered.  · Stop eating and drinking at midnight before your surgery, or as recommended by your healthcare provider.  · Take medicines as prescribed by your healthcare provider to shrink fibroids.  · Stop taking aspirin or ibuprofen 1 week before surgery. Tell your healthcare provider what other medicines you take and ask whether you should stop them.  · Arrange for a ride home after surgery.    Types of myomectomy procedures  Abdominal  Your healthcare  provider makes incisions in your stomach and uterus to remove the fibroids. Then the uterus is repaired and the incisions are closed.  Laparoscopic  Your healthcare provider inserts a laparoscope and other surgical instruments through half-inch incisions in your stomach. One or more incisions are made in your uterus to remove the fibroids. Then the uterus is repaired and the incisions are closed.  Hysteroscopic  A hysteroscope is inserted into the vagina. An instrument is used to remove the fibroids from your uterus.  Call your healthcare provider  Contact your healthcare provider right away if you have any of the following:  · Severe or increasing pain  · Fever or chills  · Nausea or vomiting  · Redness or swelling around your incision  · Persistent or heavy vaginal bleeding   Date Last Reviewed: 2/1/2017 © 2000-2017 [employer]. [address] [place] [address], [place] [address]. All rights reserved. This information is not intended as a substitute for professional medical care. Always follow your healthcare professional's instructions.

## 2020-05-29 ENCOUNTER — PATIENT OUTREACH (OUTPATIENT)
Dept: ADMINISTRATIVE | Facility: HOSPITAL | Age: 25
End: 2020-05-29

## 2020-06-08 ENCOUNTER — PATIENT MESSAGE (OUTPATIENT)
Dept: FAMILY MEDICINE | Facility: CLINIC | Age: 25
End: 2020-06-08

## 2020-06-17 ENCOUNTER — OFFICE VISIT (OUTPATIENT)
Dept: FAMILY MEDICINE | Facility: CLINIC | Age: 25
End: 2020-06-17
Payer: OTHER GOVERNMENT

## 2020-06-17 DIAGNOSIS — D50.0 IRON DEFICIENCY ANEMIA DUE TO CHRONIC BLOOD LOSS: ICD-10-CM

## 2020-06-17 DIAGNOSIS — L30.8 OTHER ECZEMA: ICD-10-CM

## 2020-06-17 DIAGNOSIS — F32.1 CURRENT MODERATE EPISODE OF MAJOR DEPRESSIVE DISORDER WITHOUT PRIOR EPISODE: Primary | ICD-10-CM

## 2020-06-17 DIAGNOSIS — F41.9 ANXIETY: ICD-10-CM

## 2020-06-17 PROCEDURE — 99214 OFFICE O/P EST MOD 30 MIN: CPT | Mod: 95,,, | Performed by: FAMILY MEDICINE

## 2020-06-17 PROCEDURE — 99214 PR OFFICE/OUTPT VISIT, EST, LEVL IV, 30-39 MIN: ICD-10-PCS | Mod: 95,,, | Performed by: FAMILY MEDICINE

## 2020-06-17 RX ORDER — TRIAMCINOLONE ACETONIDE 1 MG/G
OINTMENT TOPICAL 2 TIMES DAILY
Qty: 30 G | Refills: 1 | Status: SHIPPED | OUTPATIENT
Start: 2020-06-17 | End: 2022-02-23

## 2020-06-17 RX ORDER — FLUOXETINE HYDROCHLORIDE 40 MG/1
40 CAPSULE ORAL DAILY
Qty: 30 CAPSULE | Refills: 3 | Status: SHIPPED | OUTPATIENT
Start: 2020-06-17 | End: 2022-02-23

## 2020-06-26 ENCOUNTER — HOSPITAL ENCOUNTER (EMERGENCY)
Facility: HOSPITAL | Age: 25
Discharge: HOME | End: 2020-06-26
Attending: EMERGENCY MEDICINE
Payer: OTHER GOVERNMENT

## 2020-06-26 VITALS
HEIGHT: 64 IN | SYSTOLIC BLOOD PRESSURE: 130 MMHG | WEIGHT: 148 LBS | DIASTOLIC BLOOD PRESSURE: 83 MMHG | OXYGEN SATURATION: 99 % | BODY MASS INDEX: 25.27 KG/M2 | HEART RATE: 77 BPM | TEMPERATURE: 99 F | RESPIRATION RATE: 18 BRPM

## 2020-06-26 DIAGNOSIS — N92.0 MENORRHAGIA WITH REGULAR CYCLE: Primary | ICD-10-CM

## 2020-06-26 DIAGNOSIS — R10.9 ABDOMINAL CRAMPING: ICD-10-CM

## 2020-06-26 LAB
ALBUMIN SERPL BCP-MCNC: 3.8 G/DL (ref 3.5–5.2)
ALP SERPL-CCNC: 55 U/L (ref 55–135)
ALT SERPL W/O P-5'-P-CCNC: 6 U/L (ref 10–44)
ANION GAP SERPL CALC-SCNC: 7 MMOL/L (ref 8–16)
AST SERPL-CCNC: 15 U/L (ref 10–40)
B-HCG UR QL: NEGATIVE
BASOPHILS # BLD AUTO: 0.05 K/UL (ref 0–0.2)
BASOPHILS NFR BLD: 0.5 % (ref 0–1.9)
BILIRUB SERPL-MCNC: 0.4 MG/DL (ref 0.1–1)
BILIRUB UR QL STRIP: NEGATIVE
BUN SERPL-MCNC: 7 MG/DL (ref 6–20)
CALCIUM SERPL-MCNC: 9.4 MG/DL (ref 8.7–10.5)
CHLORIDE SERPL-SCNC: 103 MMOL/L (ref 95–110)
CLARITY UR: CLEAR
CO2 SERPL-SCNC: 26 MMOL/L (ref 23–29)
COLOR UR: ABNORMAL
CREAT SERPL-MCNC: 0.9 MG/DL (ref 0.5–1.4)
CTP QC/QA: YES
DIFFERENTIAL METHOD: ABNORMAL
EOSINOPHIL # BLD AUTO: 0.1 K/UL (ref 0–0.5)
EOSINOPHIL NFR BLD: 0.7 % (ref 0–8)
ERYTHROCYTE [DISTWIDTH] IN BLOOD BY AUTOMATED COUNT: 15.9 % (ref 11.5–14.5)
EST. GFR  (AFRICAN AMERICAN): >60 ML/MIN/1.73 M^2
EST. GFR  (NON AFRICAN AMERICAN): >60 ML/MIN/1.73 M^2
GLUCOSE SERPL-MCNC: 91 MG/DL (ref 70–110)
GLUCOSE UR QL STRIP: NEGATIVE
HCT VFR BLD AUTO: 31.2 % (ref 37–48.5)
HGB BLD-MCNC: 9.5 G/DL (ref 12–16)
HGB UR QL STRIP: ABNORMAL
IMM GRANULOCYTES # BLD AUTO: 0.02 K/UL (ref 0–0.04)
IMM GRANULOCYTES NFR BLD AUTO: 0.2 % (ref 0–0.5)
KETONES UR QL STRIP: NEGATIVE
LEUKOCYTE ESTERASE UR QL STRIP: NEGATIVE
LIPASE SERPL-CCNC: 12 U/L (ref 4–60)
LYMPHOCYTES # BLD AUTO: 1.3 K/UL (ref 1–4.8)
LYMPHOCYTES NFR BLD: 13.5 % (ref 18–48)
MCH RBC QN AUTO: 21.2 PG (ref 27–31)
MCHC RBC AUTO-ENTMCNC: 30.4 G/DL (ref 32–36)
MCV RBC AUTO: 70 FL (ref 82–98)
MICROSCOPIC COMMENT: ABNORMAL
MONOCYTES # BLD AUTO: 0.8 K/UL (ref 0.3–1)
MONOCYTES NFR BLD: 7.9 % (ref 4–15)
NEUTROPHILS # BLD AUTO: 7.5 K/UL (ref 1.8–7.7)
NEUTROPHILS NFR BLD: 77.2 % (ref 38–73)
NITRITE UR QL STRIP: NEGATIVE
NRBC BLD-RTO: 0 /100 WBC
PH UR STRIP: 6 [PH] (ref 5–8)
PLATELET # BLD AUTO: 295 K/UL (ref 150–350)
PMV BLD AUTO: 9.7 FL (ref 9.2–12.9)
POTASSIUM SERPL-SCNC: 4 MMOL/L (ref 3.5–5.1)
PROT SERPL-MCNC: 7.5 G/DL (ref 6–8.4)
PROT UR QL STRIP: NEGATIVE
RBC # BLD AUTO: 4.48 M/UL (ref 4–5.4)
RBC #/AREA URNS HPF: 10 /HPF (ref 0–4)
SODIUM SERPL-SCNC: 136 MMOL/L (ref 136–145)
SP GR UR STRIP: 1 (ref 1–1.03)
SQUAMOUS #/AREA URNS HPF: ABNORMAL /HPF
URN SPEC COLLECT METH UR: ABNORMAL
UROBILINOGEN UR STRIP-ACNC: NEGATIVE EU/DL
WBC # BLD AUTO: 9.72 K/UL (ref 3.9–12.7)

## 2020-06-26 PROCEDURE — 63600175 PHARM REV CODE 636 W HCPCS: Performed by: PHYSICIAN ASSISTANT

## 2020-06-26 PROCEDURE — 99284 EMERGENCY DEPT VISIT MOD MDM: CPT | Mod: 25

## 2020-06-26 PROCEDURE — 80053 COMPREHEN METABOLIC PANEL: CPT

## 2020-06-26 PROCEDURE — 83690 ASSAY OF LIPASE: CPT

## 2020-06-26 PROCEDURE — 85025 COMPLETE CBC W/AUTO DIFF WBC: CPT

## 2020-06-26 PROCEDURE — 25000003 PHARM REV CODE 250: Performed by: PHYSICIAN ASSISTANT

## 2020-06-26 PROCEDURE — 96374 THER/PROPH/DIAG INJ IV PUSH: CPT

## 2020-06-26 PROCEDURE — 81000 URINALYSIS NONAUTO W/SCOPE: CPT

## 2020-06-26 PROCEDURE — 81025 URINE PREGNANCY TEST: CPT | Performed by: PHYSICIAN ASSISTANT

## 2020-06-26 RX ORDER — KETOROLAC TROMETHAMINE 30 MG/ML
15 INJECTION, SOLUTION INTRAMUSCULAR; INTRAVENOUS
Status: COMPLETED | OUTPATIENT
Start: 2020-06-26 | End: 2020-06-26

## 2020-06-26 RX ORDER — DICYCLOMINE HYDROCHLORIDE 20 MG/1
20 TABLET ORAL 2 TIMES DAILY
Qty: 20 TABLET | Refills: 0 | Status: SHIPPED | OUTPATIENT
Start: 2020-06-26 | End: 2020-07-26

## 2020-06-26 RX ORDER — DICYCLOMINE HYDROCHLORIDE 10 MG/1
20 CAPSULE ORAL
Status: COMPLETED | OUTPATIENT
Start: 2020-06-26 | End: 2020-06-26

## 2020-06-26 RX ORDER — KETOROLAC TROMETHAMINE 10 MG/1
10 TABLET, FILM COATED ORAL EVERY 6 HOURS
Qty: 15 TABLET | Refills: 0 | Status: SHIPPED | OUTPATIENT
Start: 2020-06-26 | End: 2022-02-23

## 2020-06-26 RX ADMIN — DICYCLOMINE HYDROCHLORIDE 20 MG: 10 CAPSULE ORAL at 02:06

## 2020-06-26 RX ADMIN — KETOROLAC TROMETHAMINE 15 MG: 30 INJECTION, SOLUTION INTRAMUSCULAR at 11:06
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
## 2020-06-26 NOTE — ED TRIAGE NOTES
Pt presents to the ED via personal transportation reporting lower abdominal pain that radiates to the LLQ. Pt also reporting nausea but denies v/d. Pt does report she has a PMHx of fibroid tumors.

## 2020-06-26 NOTE — DISCHARGE INSTRUCTIONS
Thank you for coming to our Emergency Department today. It is important to remember that some problems are difficult to diagnose and may not be found during your first visit. Be sure to follow up with your primary care doctor and review any labs/imaging that was performed with them. If you do not have a primary care doctor, you may contact the one listed on your discharge paperwork or you may also call the [facility] Appointment Desk at [phone] to schedule an appointment with one.     All medications may potentially have side effects and it is impossible to predict which medications may give you side effects. If you feel that you are having a negative effect of any medication you should immediately stop taking them and seek medical attention.    Return to the ER with any questions/concerns, new/concerning symptoms, worsening or failure to improve. Do not drive or make any important decisions for 24 hours if you have received any pain medications, sedatives or mood altering drugs during your ER visit.

## 2020-06-29 RX ORDER — NAPROXEN 500 MG/1
500 TABLET ORAL 2 TIMES DAILY WITH MEALS
Qty: 40 TABLET | Refills: 0 | Status: SHIPPED | OUTPATIENT
Start: 2020-06-29 | End: 2022-02-23

## 2020-08-14 DIAGNOSIS — Z11.59 NEED FOR HEPATITIS C SCREENING TEST: ICD-10-CM

## 2020-10-05 ENCOUNTER — PATIENT MESSAGE (OUTPATIENT)
Dept: ADMINISTRATIVE | Facility: HOSPITAL | Age: 25
End: 2020-10-05

## 2020-10-18 ENCOUNTER — PATIENT MESSAGE (OUTPATIENT)
Dept: FAMILY MEDICINE | Facility: CLINIC | Age: 25
End: 2020-10-18

## 2020-10-18 DIAGNOSIS — J45.990 EXERCISE-INDUCED ASTHMA: Primary | ICD-10-CM

## 2020-10-19 ENCOUNTER — PATIENT MESSAGE (OUTPATIENT)
Dept: FAMILY MEDICINE | Facility: CLINIC | Age: 25
End: 2020-10-19

## 2020-10-19 RX ORDER — ALBUTEROL SULFATE 90 UG/1
AEROSOL, METERED RESPIRATORY (INHALATION)
Qty: 18 G | Refills: 0 | Status: SHIPPED | OUTPATIENT
Start: 2020-10-19 | End: 2022-02-23

## 2021-01-04 ENCOUNTER — PATIENT MESSAGE (OUTPATIENT)
Dept: ADMINISTRATIVE | Facility: HOSPITAL | Age: 26
End: 2021-01-04

## 2021-04-06 ENCOUNTER — PATIENT MESSAGE (OUTPATIENT)
Dept: ADMINISTRATIVE | Facility: HOSPITAL | Age: 26
End: 2021-04-06

## 2021-05-12 ENCOUNTER — PATIENT MESSAGE (OUTPATIENT)
Dept: RESEARCH | Facility: HOSPITAL | Age: 26
End: 2021-05-12

## 2021-07-07 ENCOUNTER — PATIENT MESSAGE (OUTPATIENT)
Dept: ADMINISTRATIVE | Facility: HOSPITAL | Age: 26
End: 2021-07-07

## 2021-10-04 ENCOUNTER — PATIENT MESSAGE (OUTPATIENT)
Dept: ADMINISTRATIVE | Facility: HOSPITAL | Age: 26
End: 2021-10-04

## 2022-02-23 ENCOUNTER — OFFICE VISIT (OUTPATIENT)
Dept: FAMILY MEDICINE | Facility: CLINIC | Age: 27
End: 2022-02-23
Payer: COMMERCIAL

## 2022-02-23 ENCOUNTER — OFFICE VISIT (OUTPATIENT)
Dept: OBSTETRICS AND GYNECOLOGY | Facility: CLINIC | Age: 27
End: 2022-02-23
Payer: COMMERCIAL

## 2022-02-23 VITALS
WEIGHT: 141.75 LBS | HEIGHT: 64 IN | BODY MASS INDEX: 24.2 KG/M2 | DIASTOLIC BLOOD PRESSURE: 74 MMHG | SYSTOLIC BLOOD PRESSURE: 122 MMHG

## 2022-02-23 VITALS
TEMPERATURE: 98 F | RESPIRATION RATE: 18 BRPM | BODY MASS INDEX: 23.71 KG/M2 | OXYGEN SATURATION: 98 % | HEIGHT: 64 IN | HEART RATE: 64 BPM | SYSTOLIC BLOOD PRESSURE: 134 MMHG | DIASTOLIC BLOOD PRESSURE: 86 MMHG | WEIGHT: 138.88 LBS

## 2022-02-23 DIAGNOSIS — F32.A MILD DEPRESSION: ICD-10-CM

## 2022-02-23 DIAGNOSIS — Z01.419 WELL WOMAN EXAM WITH ROUTINE GYNECOLOGICAL EXAM: Primary | ICD-10-CM

## 2022-02-23 DIAGNOSIS — F41.9 ANXIETY: ICD-10-CM

## 2022-02-23 DIAGNOSIS — Z13.220 ENCOUNTER FOR LIPID SCREENING FOR CARDIOVASCULAR DISEASE: ICD-10-CM

## 2022-02-23 DIAGNOSIS — Z11.59 NEED FOR HEPATITIS C SCREENING TEST: ICD-10-CM

## 2022-02-23 DIAGNOSIS — N92.0 MENORRHAGIA WITH REGULAR CYCLE: ICD-10-CM

## 2022-02-23 DIAGNOSIS — Z13.6 ENCOUNTER FOR LIPID SCREENING FOR CARDIOVASCULAR DISEASE: ICD-10-CM

## 2022-02-23 DIAGNOSIS — Z86.018 HISTORY OF UTERINE FIBROID: ICD-10-CM

## 2022-02-23 DIAGNOSIS — J45.990 EXERCISE-INDUCED ASTHMA: ICD-10-CM

## 2022-02-23 DIAGNOSIS — Z11.4 ENCOUNTER FOR SCREENING FOR HIV: ICD-10-CM

## 2022-02-23 DIAGNOSIS — Z00.00 PHYSICAL EXAM: Primary | ICD-10-CM

## 2022-02-23 DIAGNOSIS — R30.9 PAIN WITH URINATION: ICD-10-CM

## 2022-02-23 DIAGNOSIS — L20.82 FLEXURAL ECZEMA: ICD-10-CM

## 2022-02-23 PROCEDURE — 99395 PREV VISIT EST AGE 18-39: CPT | Mod: S$GLB,,, | Performed by: OBSTETRICS & GYNECOLOGY

## 2022-02-23 PROCEDURE — 99999 PR PBB SHADOW E&M-EST. PATIENT-LVL V: ICD-10-PCS | Mod: PBBFAC,,, | Performed by: FAMILY MEDICINE

## 2022-02-23 PROCEDURE — 88175 CYTOPATH C/V AUTO FLUID REDO: CPT | Performed by: OBSTETRICS & GYNECOLOGY

## 2022-02-23 PROCEDURE — 87481 CANDIDA DNA AMP PROBE: CPT | Mod: 59 | Performed by: OBSTETRICS & GYNECOLOGY

## 2022-02-23 PROCEDURE — 99395 PR PREVENTIVE VISIT,EST,18-39: ICD-10-PCS | Mod: S$GLB,,, | Performed by: FAMILY MEDICINE

## 2022-02-23 PROCEDURE — 87086 URINE CULTURE/COLONY COUNT: CPT | Performed by: OBSTETRICS & GYNECOLOGY

## 2022-02-23 PROCEDURE — 99395 PR PREVENTIVE VISIT,EST,18-39: ICD-10-PCS | Mod: S$GLB,,, | Performed by: OBSTETRICS & GYNECOLOGY

## 2022-02-23 PROCEDURE — 87147 CULTURE TYPE IMMUNOLOGIC: CPT | Performed by: OBSTETRICS & GYNECOLOGY

## 2022-02-23 PROCEDURE — 99999 PR PBB SHADOW E&M-EST. PATIENT-LVL IV: ICD-10-PCS | Mod: PBBFAC,,, | Performed by: OBSTETRICS & GYNECOLOGY

## 2022-02-23 PROCEDURE — 99999 PR PBB SHADOW E&M-EST. PATIENT-LVL IV: CPT | Mod: PBBFAC,,, | Performed by: OBSTETRICS & GYNECOLOGY

## 2022-02-23 PROCEDURE — 99395 PREV VISIT EST AGE 18-39: CPT | Mod: S$GLB,,, | Performed by: FAMILY MEDICINE

## 2022-02-23 PROCEDURE — 99999 PR PBB SHADOW E&M-EST. PATIENT-LVL V: CPT | Mod: PBBFAC,,, | Performed by: FAMILY MEDICINE

## 2022-02-23 PROCEDURE — 87088 URINE BACTERIA CULTURE: CPT | Performed by: OBSTETRICS & GYNECOLOGY

## 2022-02-23 RX ORDER — TRIAMCINOLONE ACETONIDE 1 MG/G
OINTMENT TOPICAL 2 TIMES DAILY
Qty: 30 G | Refills: 2 | Status: SHIPPED | OUTPATIENT
Start: 2022-02-23 | End: 2023-08-17 | Stop reason: SDUPTHER

## 2022-02-23 RX ORDER — ALBUTEROL SULFATE 90 UG/1
2 AEROSOL, METERED RESPIRATORY (INHALATION) EVERY 4 HOURS PRN
Qty: 18 G | Refills: 11 | Status: SHIPPED | OUTPATIENT
Start: 2022-02-23 | End: 2024-01-18 | Stop reason: SDUPTHER
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
## 2022-02-23 NOTE — PATIENT INSTRUCTIONS
Please call [phone] ([facility] [place]) to schedule your behavioral health/mental health appointment

## 2022-02-23 NOTE — PROGRESS NOTES
02/23/22 0847   Depression Patient Health Questionnaire (PHQ-2)   Over the last two weeks how often have you been bothered by little interest or pleasure in doing things 0   Over the last two weeks how often have you been bothered by feeling down, depressed or hopeless 0   PHQ-2 Total Score 0

## 2022-02-25 ENCOUNTER — PATIENT MESSAGE (OUTPATIENT)
Dept: FAMILY MEDICINE | Facility: CLINIC | Age: 27
End: 2022-02-25
Payer: COMMERCIAL

## 2022-02-25 DIAGNOSIS — D50.9 MICROCYTIC ANEMIA: Primary | ICD-10-CM

## 2022-02-25 DIAGNOSIS — N30.00 ACUTE CYSTITIS WITHOUT HEMATURIA: Primary | ICD-10-CM

## 2022-02-25 RX ORDER — CEPHALEXIN 500 MG/1
500 CAPSULE ORAL EVERY 12 HOURS
Qty: 14 CAPSULE | Refills: 0 | Status: SHIPPED | OUTPATIENT
Start: 2022-02-25 | End: 2022-03-04

## 2022-02-28 LAB — BACTERIA UR CULT: ABNORMAL

## 2022-03-02 LAB
BACTERIAL VAGINOSIS DNA: NEGATIVE
CANDIDA GLABRATA DNA: NEGATIVE
CANDIDA KRUSEI DNA: NEGATIVE
CANDIDA RRNA VAG QL PROBE: NEGATIVE
CLINICAL INFO: NORMAL
CYTO CVX: NORMAL
CYTOLOGIST CVX/VAG CYTO: NORMAL
CYTOLOGIST CVX/VAG CYTO: NORMAL
CYTOLOGY CMNT CVX/VAG CYTO-IMP: NORMAL
CYTOLOGY PAP THIN PREP EXPLANATION: NORMAL
DATE OF PREVIOUS PAP: NORMAL
DATE PREVIOUS BX: NO
GEN CATEG CVX/VAG CYTO-IMP: NORMAL
LMP START DATE: NORMAL
MICROORGANISM CVX/VAG CYTO: NORMAL
PATHOLOGIST CVX/VAG CYTO: NORMAL
SERVICE CMNT-IMP: NORMAL
SPECIMEN SOURCE CVX/VAG CYTO: NORMAL
STAT OF ADQ CVX/VAG CYTO-IMP: NORMAL
T VAGINALIS RRNA GENITAL QL PROBE: NEGATIVE

## 2022-03-07 ENCOUNTER — TELEPHONE (OUTPATIENT)
Dept: HEMATOLOGY/ONCOLOGY | Facility: CLINIC | Age: 27
End: 2022-03-07
Payer: COMMERCIAL

## 2022-03-07 NOTE — TELEPHONE ENCOUNTER
TC to pt to attempt to schedule an appt from a referral Message left on VM for pt to contact office

## 2022-03-08 ENCOUNTER — HOSPITAL ENCOUNTER (OUTPATIENT)
Dept: RADIOLOGY | Facility: HOSPITAL | Age: 27
Discharge: HOME | End: 2022-03-08
Attending: OBSTETRICS & GYNECOLOGY
Payer: COMMERCIAL

## 2022-03-08 DIAGNOSIS — N92.0 MENORRHAGIA WITH REGULAR CYCLE: ICD-10-CM

## 2022-03-08 DIAGNOSIS — Z86.018 HISTORY OF UTERINE FIBROID: ICD-10-CM

## 2022-03-08 PROCEDURE — 76856 US PELVIS COMPLETE NON OB: ICD-10-PCS | Mod: 26,,, | Performed by: RADIOLOGY

## 2022-03-08 PROCEDURE — 76856 US EXAM PELVIC COMPLETE: CPT | Mod: TC

## 2022-03-08 PROCEDURE — 76856 US EXAM PELVIC COMPLETE: CPT | Mod: 26,,, | Performed by: RADIOLOGY

## 2022-03-11 ENCOUNTER — PATIENT MESSAGE (OUTPATIENT)
Dept: OBSTETRICS AND GYNECOLOGY | Facility: CLINIC | Age: 27
End: 2022-03-11
Payer: COMMERCIAL

## 2022-03-11 RX ORDER — LACTULOSE 10 G/15ML
20 SOLUTION ORAL; RECTAL 2 TIMES DAILY PRN
Qty: 473 ML | Refills: 0 | Status: SHIPPED | OUTPATIENT
Start: 2022-03-11 | End: 2023-02-07 | Stop reason: ALTCHOICE

## 2022-03-21 ENCOUNTER — OFFICE VISIT (OUTPATIENT)
Dept: HEMATOLOGY/ONCOLOGY | Facility: CLINIC | Age: 27
End: 2022-03-21
Payer: COMMERCIAL

## 2022-03-21 VITALS
OXYGEN SATURATION: 100 % | HEIGHT: 64 IN | HEART RATE: 71 BPM | TEMPERATURE: 98 F | DIASTOLIC BLOOD PRESSURE: 73 MMHG | SYSTOLIC BLOOD PRESSURE: 124 MMHG | BODY MASS INDEX: 24.13 KG/M2 | WEIGHT: 141.31 LBS

## 2022-03-21 DIAGNOSIS — D50.0 IRON DEFICIENCY ANEMIA DUE TO CHRONIC BLOOD LOSS: Primary | ICD-10-CM

## 2022-03-21 DIAGNOSIS — D50.9 MICROCYTIC ANEMIA: ICD-10-CM

## 2022-03-21 DIAGNOSIS — N92.4 EXCESSIVE BLEEDING IN PREMENOPAUSAL PERIOD: ICD-10-CM

## 2022-03-21 PROCEDURE — 99204 OFFICE O/P NEW MOD 45 MIN: CPT | Mod: S$GLB,,, | Performed by: INTERNAL MEDICINE

## 2022-03-21 PROCEDURE — 99999 PR PBB SHADOW E&M-EST. PATIENT-LVL IV: ICD-10-PCS | Mod: PBBFAC,,, | Performed by: INTERNAL MEDICINE

## 2022-03-21 PROCEDURE — 99999 PR PBB SHADOW E&M-EST. PATIENT-LVL IV: CPT | Mod: PBBFAC,,, | Performed by: INTERNAL MEDICINE

## 2022-03-21 PROCEDURE — 99204 PR OFFICE/OUTPT VISIT, NEW, LEVL IV, 45-59 MIN: ICD-10-PCS | Mod: S$GLB,,, | Performed by: INTERNAL MEDICINE

## 2022-03-21 RX ORDER — HEPARIN 100 UNIT/ML
500 SYRINGE INTRAVENOUS
Status: CANCELLED | OUTPATIENT
Start: 2022-03-23

## 2022-03-21 RX ORDER — SODIUM CHLORIDE 0.9 % (FLUSH) 0.9 %
10 SYRINGE (ML) INJECTION
Status: CANCELLED | OUTPATIENT
Start: 2022-03-30

## 2022-03-21 RX ORDER — SODIUM CHLORIDE 0.9 % (FLUSH) 0.9 %
10 SYRINGE (ML) INJECTION
Status: CANCELLED | OUTPATIENT
Start: 2022-03-23

## 2022-03-21 RX ORDER — HEPARIN 100 UNIT/ML
500 SYRINGE INTRAVENOUS
Status: CANCELLED | OUTPATIENT
Start: 2022-03-30

## 2022-03-21 NOTE — PROGRESS NOTES
Subjective:       Patient ID: Cuong Aguayo is a 26 y.o. female.    Chief Complaint: Anemia  Reason For Consultation: KARYNA   HPI   Patient is a 27 y/o female with medical diagnoses as listed seen today in consultation for KARYNA. She has a long standing hx of KARYNA. She was recently prescribed Ferrous sulfate bid OTC x  1 week. She has drug-induced constipation. She is followed by Ob/Gyn for hx of heavy menstrual cycles. She reports that she stopped OCPs after ovarian cyst ruptured. Her periods are monthly but last 5-7 days. She craves ice. No SOB/lightheadness. She reports progressive fatigue past year. No history of prbc transfusion. No fam hx of blood or bleeding d/o. No melena, hematochezia or change in bowel habits. No fam hx of colon cancer. CBC from 3/8/22 reveals wbc ct 6960/mm3 Hb 7g/dL Hct 26.3% MCV 61 plt ct 267k. Fe studies reveals Ferritin 7. She is here for further evaluation.     Past Medical History:   Diagnosis Date    Depression     Iron deficiency anemia        No past surgical history on file.     Social History     Tobacco Use    Smoking status: Never Smoker    Smokeless tobacco: Never Used   Substance Use Topics    Alcohol use: No    Drug use: No       Review of Systems   Constitutional: Positive for fatigue (progressive). Negative for appetite change, fever and unexpected weight change.   HENT: Negative for mouth sores.    Eyes: Negative for visual disturbance.   Respiratory: Negative for cough and shortness of breath.    Cardiovascular: Negative for chest pain.   Gastrointestinal: Negative for abdominal pain and diarrhea.   Genitourinary: Negative for frequency.   Musculoskeletal: Negative for back pain.   Integumentary:  Negative for rash.   Neurological: Negative for headaches.   Hematological: Negative for adenopathy.   Psychiatric/Behavioral: The patient is not nervous/anxious.          Objective:       Vitals:    03/21/22 1035   BP: 124/73   BP Location: Left arm   Patient Position:  "Sitting   Pulse: 71   Temp: 98.1 °F (36.7 °C)   TempSrc: Oral   SpO2: 100%   Weight: 64.1 kg (141 lb 5 oz)   Height: 5' 4" (1.626 m)       Physical Exam  Constitutional:       Appearance: She is well-developed.   HENT:      Head: Normocephalic.      Right Ear: External ear normal.      Left Ear: External ear normal.      Mouth/Throat:      Pharynx: No oropharyngeal exudate.   Eyes:      General: No scleral icterus.        Right eye: No discharge.         Left eye: No discharge.      Conjunctiva/sclera: Conjunctivae normal.   Cardiovascular:      Rate and Rhythm: Normal rate and regular rhythm.      Heart sounds: Normal heart sounds. No murmur heard.  Pulmonary:      Effort: Pulmonary effort is normal.      Breath sounds: Normal breath sounds. No wheezing or rales.   Abdominal:      General: Bowel sounds are normal.      Palpations: Abdomen is soft.      Tenderness: There is no abdominal tenderness. There is no guarding or rebound.   Musculoskeletal:         General: Normal range of motion.      Cervical back: Normal range of motion and neck supple.      Right lower leg: No edema.      Left lower leg: No edema.   Skin:     Coloration: Skin is not jaundiced.      Findings: No rash.   Neurological:      General: No focal deficit present.      Mental Status: She is alert and oriented to person, place, and time.   Psychiatric:         Mood and Affect: Mood normal.         Behavior: Behavior normal.         Lab Results   Component Value Date    WBC 6.96 03/08/2022    HGB 7.0 (L) 03/08/2022    HCT 26.3 (L) 03/08/2022    MCV 61 (L) 03/08/2022     03/08/2022       Lab Results   Component Value Date    IRON 13 (L) 03/08/2022    TIBC 582 (H) 03/08/2022    FERRITIN 7 (L) 03/08/2022       US pelvis 3/8/22   Impression:     1. Fibroid uterus.  2. Endometrial stripe at the upper limits of normal in thickness at a proximally 22 mm with some cystic changes seen within the endometrium.  Findings can be seen with endometrial " hyperplasia.         Assessment:       1. Iron deficiency anemia due to chronic blood loss    2. Microcytic anemia    3.      Menorrhagia      Plan:         Problem List Items Addressed This Visit        Oncology    Iron deficiency anemia - Primary  27 y/o female with symptomatic severe KARYNA 2/2 chronic blood loss 2/2 menorrhagia.   Plan IV Fe to improve blood counts and replete Fe stores urgently otherwise, pt will require prbc transfusion. Plan repeat cbc, Fe studies in 1 newton.     Relevant Orders    CBC Auto Differential    Ferritin    Iron and TIBC      Other Visit Diagnoses     Microcytic anemia        Cpnsider testing for co existing hemoglobinopathy once Fe stores adequately repleted.       Excessive bleeding in premenopausal period      Followed by Gyn           Cc: Pedro Mcdonough Jr., MD

## 2022-03-21 NOTE — Clinical Note
Schedule IV FE asap ( Hb 7g/dL)  Cbc, Ferritin, TIBC prior to f/u 1 month piror to f/u televist Educate on televisit

## 2022-03-24 ENCOUNTER — PATIENT MESSAGE (OUTPATIENT)
Dept: OBSTETRICS AND GYNECOLOGY | Facility: CLINIC | Age: 27
End: 2022-03-24
Payer: COMMERCIAL

## 2022-04-01 ENCOUNTER — TELEPHONE (OUTPATIENT)
Dept: HEMATOLOGY/ONCOLOGY | Facility: CLINIC | Age: 27
End: 2022-04-01
Payer: COMMERCIAL

## 2022-04-01 NOTE — TELEPHONE ENCOUNTER
Rec'd incoming call from patient regarding status on IV Iron (FE), patient was advised that authorization was still pending as of Friday, 4/1/2022 and to check back with our office on Thursday, 4/7 or Friday, 4/8/2022 to check status. Patient was also informed that once authorization is approved then the [facility] will definitely call to schedule. Patient will continue to take oral Iron capsules/tablets as instructed at clinic appointment on 3/21/2022 . Patient verbalized understanding.

## 2022-04-04 ENCOUNTER — PROCEDURE VISIT (OUTPATIENT)
Dept: OBSTETRICS AND GYNECOLOGY | Facility: CLINIC | Age: 27
End: 2022-04-04
Payer: COMMERCIAL

## 2022-04-04 VITALS
BODY MASS INDEX: 24.48 KG/M2 | WEIGHT: 142.63 LBS | SYSTOLIC BLOOD PRESSURE: 130 MMHG | DIASTOLIC BLOOD PRESSURE: 86 MMHG

## 2022-04-04 DIAGNOSIS — R93.89 THICKENED ENDOMETRIUM: Primary | ICD-10-CM

## 2022-04-04 PROCEDURE — 99212 OFFICE O/P EST SF 10 MIN: CPT | Mod: S$GLB,,, | Performed by: OBSTETRICS & GYNECOLOGY

## 2022-04-04 PROCEDURE — 99212 PR OFFICE/OUTPT VISIT, EST, LEVL II, 10-19 MIN: ICD-10-PCS | Mod: S$GLB,,, | Performed by: OBSTETRICS & GYNECOLOGY

## 2022-04-04 NOTE — PROGRESS NOTES
SUBJECTIVE:   26 y.o. female No obstetric history on file.  for EMB    Patient's last menstrual period was 03/16/2022 (exact date)..      Pt of Dr. Santoyo, she was evaluated for heavy menstrual bleeding, US was performed which showed multiple fibroids and ES of 22 mm - therefore Dr. Santoyo recommended to have EMB.  She is very nervous about EMB and wants to know if this can be done under general anesthesia    Pt noted pelvic US was done about 8 days prior to last menses.   ocp was started prior to this for AUB-HMB, menses have always been heavy all her life but  Monthly , recently becoming more painful  Denies IMB         EXAMINATION:  US PELVIS COMPLETE NON OB     CLINICAL HISTORY:  enlarged uterus; Excessive and frequent menstruation with regular cycle     TECHNIQUE:  Transabdominal sonography of the pelvis was performed, followed by transvaginal sonography to better evaluate the uterus and ovaries.     COMPARISON:  06/26/2020     FINDINGS:  The uterus measures 10.8 cm in length. Multiple fibroids are noted.  The 3 largest are noted in the region of the fundus and range in size from 1.6-6.0 cm.  The endometrial stripe in this premenopausal patient measures 21.5 mm which is somewhat thickened.  There are some cystic changes seen within the endometrium..  The right ovary measures 3.6 x 1.6 x 2.1 cm.  The left ovary measures 6.7 x 2.5 x 2.7 cm.    Vascular flow demonstrated to both ovaries.  No free fluid identified.     Impression:     1. Fibroid uterus.  2. Endometrial stripe at the upper limits of normal in thickness at a proximally 22 mm with some cystic changes seen within the endometrium.  Findings can be seen with endometrial hyperplasia.    OB History   No obstetric history on file.     Past Medical History:   Diagnosis Date    Depression     Iron deficiency anemia      No past surgical history on file.  Social History     Socioeconomic History    Marital status: Single   Tobacco Use    Smoking status:  Never Smoker    Smokeless tobacco: Never Used   Substance and Sexual Activity    Alcohol use: No    Drug use: No    Sexual activity: Never     Social Determinants of Health     Financial Resource Strain: Low Risk     Difficulty of Paying Living Expenses: Not hard at all   Food Insecurity: No Food Insecurity    Worried About Running Out of Food in the Last Year: Never true    Ran Out of Food in the Last Year: Never true   Transportation Needs: No Transportation Needs    Lack of Transportation (Medical): No    Lack of Transportation (Non-Medical): No   Physical Activity: Inactive    Days of Exercise per Week: 0 days    Minutes of Exercise per Session: 0 min   Stress: Stress Concern Present    Feeling of Stress : Rather much   Social Connections: Unknown    Frequency of Communication with Friends and Family: Once a week    Frequency of Social Gatherings with Friends and Family: Never    Active Member of Clubs or Organizations: Yes    Attends Club or Organization Meetings: Never    Marital Status: Never    Housing Stability: Low Risk     Unable to Pay for Housing in the Last Year: No    Number of Places Lived in the Last Year: 2    Unstable Housing in the Last Year: No     Family History   Problem Relation Age of Onset    Bipolar disorder Father     Kidney failure Father         dialysis    Diabetes type II Father     Hypertension Father          Current Outpatient Medications   Medication Sig Dispense Refill    albuterol (PROAIR HFA) 90 mcg/actuation inhaler Inhale 2 puffs into the lungs every 4 (four) hours as needed for Shortness of Breath. Rescue 18 g 11    ferrous fumarate-folic acid 324 mg, 106mg iron,-1mg (HEMOCYTE-F) Tab Take 1 tablet by mouth 2 (two) times a day. 180 tablet 0    lactulose (CHRONULAC) 10 gram/15 mL solution Take 30 mLs (20 g total) by mouth 2 (two) times daily as needed (constipation). 473 mL 0    triamcinolone acetonide 0.1% (KENALOG) 0.1 % ointment Apply  topically 2 (two) times daily. To affected areas for 5 days 30 g 2     No current facility-administered medications for this visit.     Allergies: Shrimp       ROS:  GENERAL: Denies weight gain or weight loss. Feeling well overall.   SKIN: Denies rash or lesions.   HEAD: Denies head injury or headache.   NODES: Denies enlarged lymph nodes.   CHEST: Denies chest pain or shortness of breath.   CARDIOVASCULAR: Denies palpitations or left sided chest pain.   ABDOMEN: No abdominal pain, constipation, diarrhea, nausea, vomiting or rectal bleeding.   URINARY: No frequency, dysuria, hematuria, or burning on urination.  REPRODUCTIVE: Denies vaginal discharge, abnormal vaginal bleeding, lesions, pelvic pain  BREASTS: The patient performs breast self-examination and denies pain, lumps, or nipple discharge.   HEMATOLOGIC: No easy bruisability or excessive bleeding.  MUSCULOSKELETAL: Denies joint pain or swelling.   NEUROLOGIC: Denies syncope or weakness.   PSYCHIATRIC: Denies depression, anxiety or mood swings.    OBJECTIVE:   /86   Wt 64.7 kg (142 lb 10.2 oz)   LMP 03/16/2022 (Exact Date)   BMI 24.48 kg/m²   The patient appears well, alert, oriented x 3, in no distress.    Counseling appt    ASSESSMENT:   1.  Thickened ES: likely menstrual cycle related.  US was done around luteal phase, discussed repeat US around last few days of her next period.   Hold ocp until repeat US done  All questions answered

## 2022-04-11 ENCOUNTER — TELEPHONE (OUTPATIENT)
Dept: HEMATOLOGY/ONCOLOGY | Facility: CLINIC | Age: 27
End: 2022-04-11
Payer: COMMERCIAL

## 2022-04-11 ENCOUNTER — LAB VISIT (OUTPATIENT)
Dept: LAB | Facility: HOSPITAL | Age: 27
End: 2022-04-11
Attending: FAMILY MEDICINE
Payer: COMMERCIAL

## 2022-04-11 DIAGNOSIS — D50.0 IRON DEFICIENCY ANEMIA DUE TO CHRONIC BLOOD LOSS: Primary | ICD-10-CM

## 2022-04-11 DIAGNOSIS — D50.0 IRON DEFICIENCY ANEMIA DUE TO CHRONIC BLOOD LOSS: ICD-10-CM

## 2022-04-11 PROCEDURE — 85025 COMPLETE CBC W/AUTO DIFF WBC: CPT | Performed by: INTERNAL MEDICINE

## 2022-04-11 PROCEDURE — 36415 COLL VENOUS BLD VENIPUNCTURE: CPT | Mod: PO | Performed by: INTERNAL MEDICINE

## 2022-04-11 NOTE — TELEPHONE ENCOUNTER
Patient called and stated they are still waiting on insurance to approve iv iron. patient wants to know if she should repeat labs to make sure she doesn't need blood transfusion.

## 2022-04-12 LAB
ANISOCYTOSIS BLD QL SMEAR: ABNORMAL
BASOPHILS # BLD AUTO: 0.06 K/UL (ref 0–0.2)
BASOPHILS NFR BLD: 0.8 % (ref 0–1.9)
DIFFERENTIAL METHOD: ABNORMAL
EOSINOPHIL # BLD AUTO: 0.1 K/UL (ref 0–0.5)
EOSINOPHIL NFR BLD: 1.4 % (ref 0–8)
ERYTHROCYTE [DISTWIDTH] IN BLOOD BY AUTOMATED COUNT: ABNORMAL % (ref 11.5–14.5)
HCT VFR BLD AUTO: 36.9 % (ref 37–48.5)
HGB BLD-MCNC: 10.5 G/DL (ref 12–16)
HYPOCHROMIA BLD QL SMEAR: ABNORMAL
IMM GRANULOCYTES # BLD AUTO: 0.02 K/UL (ref 0–0.04)
IMM GRANULOCYTES NFR BLD AUTO: 0.3 % (ref 0–0.5)
LYMPHOCYTES # BLD AUTO: 1.7 K/UL (ref 1–4.8)
LYMPHOCYTES NFR BLD: 22.1 % (ref 18–48)
MCH RBC QN AUTO: 20.4 PG (ref 27–31)
MCHC RBC AUTO-ENTMCNC: 28.5 G/DL (ref 32–36)
MCV RBC AUTO: 72 FL (ref 82–98)
MONOCYTES # BLD AUTO: 0.6 K/UL (ref 0.3–1)
MONOCYTES NFR BLD: 7.3 % (ref 4–15)
NEUTROPHILS # BLD AUTO: 5.3 K/UL (ref 1.8–7.7)
NEUTROPHILS NFR BLD: 68.1 % (ref 38–73)
NRBC BLD-RTO: 0 /100 WBC
OVALOCYTES BLD QL SMEAR: ABNORMAL
PLATELET # BLD AUTO: 219 K/UL (ref 150–450)
PMV BLD AUTO: ABNORMAL FL (ref 9.2–12.9)
POIKILOCYTOSIS BLD QL SMEAR: ABNORMAL
POLYCHROMASIA BLD QL SMEAR: ABNORMAL
RBC # BLD AUTO: 5.14 M/UL (ref 4–5.4)
WBC # BLD AUTO: 7.7 K/UL (ref 3.9–12.7)

## 2022-04-14 ENCOUNTER — PATIENT MESSAGE (OUTPATIENT)
Dept: OBSTETRICS AND GYNECOLOGY | Facility: CLINIC | Age: 27
End: 2022-04-14
Payer: COMMERCIAL

## 2022-04-14 ENCOUNTER — HOSPITAL ENCOUNTER (OUTPATIENT)
Dept: RADIOLOGY | Facility: HOSPITAL | Age: 27
Discharge: HOME | End: 2022-04-14
Attending: OBSTETRICS & GYNECOLOGY
Payer: COMMERCIAL

## 2022-04-14 DIAGNOSIS — R93.89 THICKENED ENDOMETRIUM: ICD-10-CM

## 2022-04-14 PROCEDURE — 76856 US PELVIS COMPLETE NON OB: ICD-10-PCS | Mod: 26,,, | Performed by: RADIOLOGY

## 2022-04-14 PROCEDURE — 76856 US EXAM PELVIC COMPLETE: CPT | Mod: 26,,, | Performed by: RADIOLOGY

## 2022-04-14 PROCEDURE — 76856 US EXAM PELVIC COMPLETE: CPT | Mod: TC

## 2022-04-14 NOTE — PROGRESS NOTES
Thaddeus Hutchins  Good news your endometrium lining is now normal - it was likely related to different phases of menstrual cycle. You can start your birth control pills for menstrual cramp.  I would start on the first day of your next cycle

## 2022-04-15 ENCOUNTER — PATIENT MESSAGE (OUTPATIENT)
Dept: OBSTETRICS AND GYNECOLOGY | Facility: CLINIC | Age: 27
End: 2022-04-15
Payer: COMMERCIAL

## 2022-04-19 ENCOUNTER — PATIENT MESSAGE (OUTPATIENT)
Dept: OBSTETRICS AND GYNECOLOGY | Facility: CLINIC | Age: 27
End: 2022-04-19
Payer: COMMERCIAL

## 2022-04-19 RX ORDER — DROSPIRENONE AND ETHINYL ESTRADIOL 0.02-3(28)
1 KIT ORAL DAILY
Qty: 30 TABLET | Refills: 11 | Status: SHIPPED | OUTPATIENT
Start: 2022-04-19 | End: 2023-04-03

## 2022-05-06 ENCOUNTER — OFFICE VISIT (OUTPATIENT)
Dept: HEMATOLOGY/ONCOLOGY | Facility: CLINIC | Age: 27
End: 2022-05-06
Payer: COMMERCIAL

## 2022-05-06 ENCOUNTER — LAB VISIT (OUTPATIENT)
Dept: LAB | Facility: HOSPITAL | Age: 27
End: 2022-05-06
Attending: INTERNAL MEDICINE
Payer: COMMERCIAL

## 2022-05-06 VITALS
OXYGEN SATURATION: 100 % | DIASTOLIC BLOOD PRESSURE: 76 MMHG | TEMPERATURE: 98 F | HEART RATE: 57 BPM | SYSTOLIC BLOOD PRESSURE: 135 MMHG | WEIGHT: 147.06 LBS | BODY MASS INDEX: 25.24 KG/M2

## 2022-05-06 DIAGNOSIS — D50.0 IRON DEFICIENCY ANEMIA DUE TO CHRONIC BLOOD LOSS: Primary | ICD-10-CM

## 2022-05-06 DIAGNOSIS — D50.0 IRON DEFICIENCY ANEMIA DUE TO CHRONIC BLOOD LOSS: ICD-10-CM

## 2022-05-06 LAB
ERYTHROCYTE [DISTWIDTH] IN BLOOD BY AUTOMATED COUNT: ABNORMAL % (ref 11.5–14.5)
HCT VFR BLD AUTO: 35.7 % (ref 37–48.5)
HGB BLD-MCNC: 10.8 G/DL (ref 12–16)
IMM GRANULOCYTES # BLD AUTO: 0.02 K/UL (ref 0–0.04)
MCH RBC QN AUTO: 22.1 PG (ref 27–31)
MCHC RBC AUTO-ENTMCNC: 30.3 G/DL (ref 32–36)
MCV RBC AUTO: 73 FL (ref 82–98)
NEUTROPHILS # BLD AUTO: 4 K/UL (ref 1.8–7.7)
PLATELET # BLD AUTO: 222 K/UL (ref 150–450)
PMV BLD AUTO: 9.2 FL (ref 9.2–12.9)
RBC # BLD AUTO: 4.89 M/UL (ref 4–5.4)
WBC # BLD AUTO: 6.36 K/UL (ref 3.9–12.7)

## 2022-05-06 PROCEDURE — 36415 COLL VENOUS BLD VENIPUNCTURE: CPT | Performed by: INTERNAL MEDICINE

## 2022-05-06 PROCEDURE — 82728 ASSAY OF FERRITIN: CPT | Performed by: INTERNAL MEDICINE

## 2022-05-06 PROCEDURE — 85027 COMPLETE CBC AUTOMATED: CPT | Performed by: INTERNAL MEDICINE

## 2022-05-06 PROCEDURE — 99214 PR OFFICE/OUTPT VISIT, EST, LEVL IV, 30-39 MIN: ICD-10-PCS | Mod: S$GLB,,, | Performed by: INTERNAL MEDICINE

## 2022-05-06 PROCEDURE — 99999 PR PBB SHADOW E&M-EST. PATIENT-LVL III: ICD-10-PCS | Mod: PBBFAC,,, | Performed by: INTERNAL MEDICINE

## 2022-05-06 PROCEDURE — 99214 OFFICE O/P EST MOD 30 MIN: CPT | Mod: S$GLB,,, | Performed by: INTERNAL MEDICINE

## 2022-05-06 PROCEDURE — 99999 PR PBB SHADOW E&M-EST. PATIENT-LVL III: CPT | Mod: PBBFAC,,, | Performed by: INTERNAL MEDICINE

## 2022-05-06 NOTE — PROGRESS NOTES
Subjective:       Patient ID: Cuong Aguayo is a 26 y.o. female.    Chief Complaint: No chief complaint on file.    HPI     Mrs. Aguayo is a 26-year-old  female who presents for evaluation for iron deficiency anemia.  She was previously evaluated by Dr. Vee at the [facility].  I have reviewed Dr. Vee's notes.   Briefly, she is a 25 y/o female diagnosed with KARYNA.   Since March she has been on iron supplementation therapy which she takes orally twice a day.      Her CBC on 2022 had shown a white count of 6,900 per cubic mm mm, hemoglobin 7 grams/deciliter, hematocrit 26.3% and platelets 036942 per cubic mm.  MCV was 61 while her ferritin was 7 ng/mL.  She was started on oral iron supplements by Dr. Vee.  Subsequent CBC on 2022 had shown a white count of 7700 per cubic mm, hemoglobin 10.5 grams/deciliter, hematocrit 36.9%, MCV 72 and platelets 613550 per cubic mm.  She presents today for evaluation.        PAST MEDICAL HISTORY:  Unremarkable  PAST SURGICAL HISTORY:  No prior surgery  SOCIAL HISTORY:  She works as a pharmacist.  She does not smoke and does not drink alcohol.  FAMILY HISTORY:  Negative for cancer  GYN HISTORY:  She is .  She has menorrhagia is in she states that her periods last up to 10 days at a time.  Recent ultrasound showed multiple fibroid tumors measuring up to 5.8 cm.         Review of Systems    Overall she feels well.  Her main complaint today is mild fatigue, feeling cold and occasional headaches.  She had previously complain of craving for ice, however, this has decreased with the iron supplementation therapy.  She also mentions abdominal discomfort with her periods.  As mentioned above, she does have menorrhagias.  She denies any anxiety, depression, easy bruising, fevers, chills, night  sweats, weight loss, nausea, vomiting, diarrhea, constipation, diplopia, blurred vision, chest pain, palpitations, shortness of breath, breast pain, abdominal  pain, extremity pain, or difficulty ambulating.  The remainder of the ten-point ROS, including general, skin, lymph, H/N, cardiorespiratory, GI, , Neuro, Endocrine, and psychiatric is negative.     Objective:      Physical Exam      She is alert, oriented to time, place, person, pleasant, well      nourished, in no acute physical distress.                                    VITAL SIGNS:  Reviewed                                      HEENT:  Normal.  There are no nasal, oral, lip, gingival, auricular, lid,    or conjunctival lesions.  Mucosae are moist and pink, and there is no        thrush.  Pupils are equal, reactive to light and accommodation.              Extraocular muscle movements are intact.  Dentition is good.  There is no frontal or maxillary tenderness.                                     NECK:  Supple without JVD, adenopathy, or thyromegaly.                       LUNGS:  Clear to auscultation without wheezing, rales, or rhonchi.           CARDIOVASCULAR:  Reveals an S1, S2, no murmurs, no rubs, no gallops.         ABDOMEN:  Soft, with mild suprapubic tenderness on examination, without organomegaly.  Uterus is not palpable.   Bowel sounds are    present.                                                                     EXTREMITIES:  No cyanosis, clubbing, or edema.                               BREASTS:  Deferred                                      LYMPHATIC:  There is no cervical, axillary, or supraclavicular adenopathy.   SKIN:  Warm and moist, without petechiae, rashes, induration, or ecchymoses.           NEUROLOGIC:  DTRs are 0-1+ bilaterally, symmetrical, motor function is 5/5,  and cranial nerves are  within normal limits.    Assessment:             1.  Fe deficiency secondary to menorrhagias.  2.  Mennorhagia  Plan:         I had a long discussion with her.  She clearly had iron deficiency anemia, however, based on the most recent CBC from April 11, 2022 she appears to be improving.  At this  point we will proceed as follows:  1. We will repeat her CBC and ferritin today  2. We will obtain a urine sample to ascertain that she does not have hematuria  3. Patient was submit 3 stool samples at the time of her next visit.  4. She will remain on iron supplementation therapy for 6-8 weeks after correction of her anemia to replenish her iron stores.  5.  Finally, we advised her to discuss with her OB/GYN physician what can be done from the GYN standpoint to mitigate the blood loss.  I will see her again in 4 weeks with a CBC and a ferritin and will call her later today to discuss the results of today's lab work.      9:58 a.m. ADDENDUM  CBC today shows a white count of 6300 per cubic mm, hemoglobin 10.8 grams/deciliter, hematocrit 35.7%, MCV 73 and platelets 161532 per cubic mm.  Patient to remain on iron supplementation therapy and see me with a repeat CBC and ferritin in a month.

## 2022-05-07 LAB — FERRITIN SERPL-MCNC: 30 NG/ML (ref 20–300)

## 2022-05-15 ENCOUNTER — PATIENT MESSAGE (OUTPATIENT)
Dept: FAMILY MEDICINE | Facility: CLINIC | Age: 27
End: 2022-05-15
Payer: COMMERCIAL

## 2022-05-31 ENCOUNTER — LAB VISIT (OUTPATIENT)
Dept: LAB | Facility: HOSPITAL | Age: 27
End: 2022-05-31
Attending: INTERNAL MEDICINE
Payer: COMMERCIAL

## 2022-05-31 DIAGNOSIS — D50.0 IRON DEFICIENCY ANEMIA DUE TO CHRONIC BLOOD LOSS: ICD-10-CM

## 2022-05-31 LAB
ERYTHROCYTE [DISTWIDTH] IN BLOOD BY AUTOMATED COUNT: 19.8 % (ref 11.5–14.5)
HCT VFR BLD AUTO: 39.1 % (ref 37–48.5)
HGB BLD-MCNC: 11.8 G/DL (ref 12–16)
IMM GRANULOCYTES # BLD AUTO: 0.01 K/UL (ref 0–0.04)
MCH RBC QN AUTO: 23.3 PG (ref 27–31)
MCHC RBC AUTO-ENTMCNC: 30.2 G/DL (ref 32–36)
MCV RBC AUTO: 77 FL (ref 82–98)
NEUTROPHILS # BLD AUTO: 4.5 K/UL (ref 1.8–7.7)
PLATELET # BLD AUTO: 222 K/UL (ref 150–450)
PMV BLD AUTO: ABNORMAL FL (ref 9.2–12.9)
RBC # BLD AUTO: 5.07 M/UL (ref 4–5.4)
WBC # BLD AUTO: 6.43 K/UL (ref 3.9–12.7)

## 2022-05-31 PROCEDURE — 82728 ASSAY OF FERRITIN: CPT | Performed by: INTERNAL MEDICINE

## 2022-05-31 PROCEDURE — 36415 COLL VENOUS BLD VENIPUNCTURE: CPT | Mod: PO | Performed by: INTERNAL MEDICINE

## 2022-05-31 PROCEDURE — 85027 COMPLETE CBC AUTOMATED: CPT | Performed by: INTERNAL MEDICINE

## 2022-06-01 LAB — FERRITIN SERPL-MCNC: 24 NG/ML (ref 20–300)

## 2022-06-03 ENCOUNTER — OFFICE VISIT (OUTPATIENT)
Dept: HEMATOLOGY/ONCOLOGY | Facility: CLINIC | Age: 27
End: 2022-06-03
Payer: COMMERCIAL

## 2022-06-03 VITALS
TEMPERATURE: 98 F | WEIGHT: 144.38 LBS | DIASTOLIC BLOOD PRESSURE: 78 MMHG | HEART RATE: 62 BPM | SYSTOLIC BLOOD PRESSURE: 125 MMHG | HEIGHT: 64 IN | BODY MASS INDEX: 24.65 KG/M2 | OXYGEN SATURATION: 100 %

## 2022-06-03 DIAGNOSIS — D50.0 IRON DEFICIENCY ANEMIA DUE TO CHRONIC BLOOD LOSS: Primary | ICD-10-CM

## 2022-06-03 PROCEDURE — 99999 PR PBB SHADOW E&M-EST. PATIENT-LVL III: ICD-10-PCS | Mod: PBBFAC,,, | Performed by: INTERNAL MEDICINE

## 2022-06-03 PROCEDURE — 99999 PR PBB SHADOW E&M-EST. PATIENT-LVL III: CPT | Mod: PBBFAC,,, | Performed by: INTERNAL MEDICINE

## 2022-06-03 PROCEDURE — 99214 PR OFFICE/OUTPT VISIT, EST, LEVL IV, 30-39 MIN: ICD-10-PCS | Mod: S$GLB,,, | Performed by: INTERNAL MEDICINE

## 2022-06-03 PROCEDURE — 99214 OFFICE O/P EST MOD 30 MIN: CPT | Mod: S$GLB,,, | Performed by: INTERNAL MEDICINE

## 2022-09-16 ENCOUNTER — OFFICE VISIT (OUTPATIENT)
Dept: OBSTETRICS AND GYNECOLOGY | Facility: CLINIC | Age: 27
End: 2022-09-16
Payer: COMMERCIAL

## 2022-09-16 VITALS
DIASTOLIC BLOOD PRESSURE: 80 MMHG | SYSTOLIC BLOOD PRESSURE: 128 MMHG | WEIGHT: 149.94 LBS | BODY MASS INDEX: 25.6 KG/M2 | HEIGHT: 64 IN

## 2022-09-16 DIAGNOSIS — M62.89 PELVIC FLOOR DYSFUNCTION IN FEMALE: Primary | ICD-10-CM

## 2022-09-16 PROCEDURE — 99999 PR PBB SHADOW E&M-EST. PATIENT-LVL III: CPT | Mod: PBBFAC,,, | Performed by: OBSTETRICS & GYNECOLOGY

## 2022-09-16 PROCEDURE — 99213 OFFICE O/P EST LOW 20 MIN: CPT | Mod: S$GLB,,, | Performed by: OBSTETRICS & GYNECOLOGY

## 2022-09-16 PROCEDURE — 99213 PR OFFICE/OUTPT VISIT, EST, LEVL III, 20-29 MIN: ICD-10-PCS | Mod: S$GLB,,, | Performed by: OBSTETRICS & GYNECOLOGY

## 2022-09-16 PROCEDURE — 99999 PR PBB SHADOW E&M-EST. PATIENT-LVL III: ICD-10-PCS | Mod: PBBFAC,,, | Performed by: OBSTETRICS & GYNECOLOGY
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
## 2023-02-07 ENCOUNTER — OFFICE VISIT (OUTPATIENT)
Dept: FAMILY MEDICINE | Facility: CLINIC | Age: 28
End: 2023-02-07
Payer: COMMERCIAL

## 2023-02-07 VITALS
HEIGHT: 64 IN | TEMPERATURE: 99 F | BODY MASS INDEX: 27.55 KG/M2 | WEIGHT: 161.38 LBS | SYSTOLIC BLOOD PRESSURE: 128 MMHG | HEART RATE: 69 BPM | DIASTOLIC BLOOD PRESSURE: 76 MMHG | OXYGEN SATURATION: 99 %

## 2023-02-07 DIAGNOSIS — Z13.6 ENCOUNTER FOR LIPID SCREENING FOR CARDIOVASCULAR DISEASE: ICD-10-CM

## 2023-02-07 DIAGNOSIS — F43.10 POST TRAUMATIC STRESS DISORDER: ICD-10-CM

## 2023-02-07 DIAGNOSIS — J45.20 MILD INTERMITTENT ASTHMA WITHOUT COMPLICATION: ICD-10-CM

## 2023-02-07 DIAGNOSIS — Z13.220 ENCOUNTER FOR LIPID SCREENING FOR CARDIOVASCULAR DISEASE: ICD-10-CM

## 2023-02-07 DIAGNOSIS — D50.0 IRON DEFICIENCY ANEMIA DUE TO CHRONIC BLOOD LOSS: ICD-10-CM

## 2023-02-07 DIAGNOSIS — Z00.00 ROUTINE MEDICAL EXAM: Primary | ICD-10-CM

## 2023-02-07 DIAGNOSIS — M62.89 PELVIC FLOOR DYSFUNCTION: ICD-10-CM

## 2023-02-07 PROBLEM — F32.1 CURRENT MODERATE EPISODE OF MAJOR DEPRESSIVE DISORDER WITHOUT PRIOR EPISODE: Status: RESOLVED | Noted: 2018-09-07 | Resolved: 2023-02-07

## 2023-02-07 PROCEDURE — 99395 PR PREVENTIVE VISIT,EST,18-39: ICD-10-PCS | Mod: S$GLB,,, | Performed by: FAMILY MEDICINE

## 2023-02-07 PROCEDURE — 1159F PR MEDICATION LIST DOCUMENTED IN MEDICAL RECORD: ICD-10-PCS | Mod: CPTII,S$GLB,, | Performed by: FAMILY MEDICINE

## 2023-02-07 PROCEDURE — 1159F MED LIST DOCD IN RCRD: CPT | Mod: CPTII,S$GLB,, | Performed by: FAMILY MEDICINE

## 2023-02-07 PROCEDURE — 1160F PR REVIEW ALL MEDS BY PRESCRIBER/CLIN PHARMACIST DOCUMENTED: ICD-10-PCS | Mod: CPTII,S$GLB,, | Performed by: FAMILY MEDICINE

## 2023-02-07 PROCEDURE — 99999 PR PBB SHADOW E&M-EST. PATIENT-LVL IV: ICD-10-PCS | Mod: PBBFAC,,, | Performed by: FAMILY MEDICINE

## 2023-02-07 PROCEDURE — 3008F BODY MASS INDEX DOCD: CPT | Mod: CPTII,S$GLB,, | Performed by: FAMILY MEDICINE

## 2023-02-07 PROCEDURE — 99395 PREV VISIT EST AGE 18-39: CPT | Mod: S$GLB,,, | Performed by: FAMILY MEDICINE

## 2023-02-07 PROCEDURE — 99999 PR PBB SHADOW E&M-EST. PATIENT-LVL IV: CPT | Mod: PBBFAC,,, | Performed by: FAMILY MEDICINE

## 2023-02-07 PROCEDURE — 3078F DIAST BP <80 MM HG: CPT | Mod: CPTII,S$GLB,, | Performed by: FAMILY MEDICINE

## 2023-02-07 PROCEDURE — 1160F RVW MEDS BY RX/DR IN RCRD: CPT | Mod: CPTII,S$GLB,, | Performed by: FAMILY MEDICINE

## 2023-02-07 PROCEDURE — 3078F PR MOST RECENT DIASTOLIC BLOOD PRESSURE < 80 MM HG: ICD-10-PCS | Mod: CPTII,S$GLB,, | Performed by: FAMILY MEDICINE

## 2023-02-07 PROCEDURE — 3074F PR MOST RECENT SYSTOLIC BLOOD PRESSURE < 130 MM HG: ICD-10-PCS | Mod: CPTII,S$GLB,, | Performed by: FAMILY MEDICINE

## 2023-02-07 PROCEDURE — 3074F SYST BP LT 130 MM HG: CPT | Mod: CPTII,S$GLB,, | Performed by: FAMILY MEDICINE

## 2023-02-07 PROCEDURE — 3008F PR BODY MASS INDEX (BMI) DOCUMENTED: ICD-10-PCS | Mod: CPTII,S$GLB,, | Performed by: FAMILY MEDICINE

## 2023-02-07 NOTE — PATIENT INSTRUCTIONS
Please call [phone] ([facility]); [phone]( [facility]) to schedule to see a therapist

## 2023-02-12 PROBLEM — F43.10 POST TRAUMATIC STRESS DISORDER: Status: ACTIVE | Noted: 2023-02-12

## 2023-02-12 PROBLEM — D50.0 IRON DEFICIENCY ANEMIA DUE TO CHRONIC BLOOD LOSS: Status: RESOLVED | Noted: 2022-03-21 | Resolved: 2023-02-12

## 2023-02-12 PROBLEM — M62.89 PELVIC FLOOR DYSFUNCTION: Status: ACTIVE | Noted: 2023-02-12
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
## 2023-02-13 NOTE — PROGRESS NOTES
Answers submitted by the patient for this visit:  Review of Systems Questionnaire (Submitted on 2/2/2023)  activity change: No  unexpected weight change: No  neck pain: No  hearing loss: No  rhinorrhea: No  trouble swallowing: No  eye discharge: No  visual disturbance: No  chest tightness: No  wheezing: No  chest pain: No  palpitations: No  blood in stool: No  constipation: No  vomiting: No  diarrhea: No  polydipsia: No  polyuria: Yes  difficulty urinating: No  hematuria: No  menstrual problem: Yes  dysuria: No  joint swelling: No  arthralgias: No  headaches: No  weakness: No  confusion: No  dysphoric mood: Yes

## 2023-02-17 ENCOUNTER — PATIENT MESSAGE (OUTPATIENT)
Dept: FAMILY MEDICINE | Facility: CLINIC | Age: 28
End: 2023-02-17
Payer: COMMERCIAL

## 2023-02-17 DIAGNOSIS — Z00.00 ROUTINE MEDICAL EXAM: Primary | ICD-10-CM

## 2023-02-17 DIAGNOSIS — R35.0 URINE FREQUENCY: ICD-10-CM

## 2023-02-17 DIAGNOSIS — Z13.220 ENCOUNTER FOR LIPID SCREENING FOR CARDIOVASCULAR DISEASE: ICD-10-CM

## 2023-02-17 DIAGNOSIS — Z13.6 ENCOUNTER FOR LIPID SCREENING FOR CARDIOVASCULAR DISEASE: ICD-10-CM

## 2023-02-17 DIAGNOSIS — D50.0 IRON DEFICIENCY ANEMIA DUE TO CHRONIC BLOOD LOSS: ICD-10-CM

## 2023-02-21 ENCOUNTER — PATIENT MESSAGE (OUTPATIENT)
Dept: REHABILITATION | Facility: HOSPITAL | Age: 28
End: 2023-02-21
Payer: COMMERCIAL

## 2023-02-22 ENCOUNTER — LAB VISIT (OUTPATIENT)
Dept: LAB | Facility: HOSPITAL | Age: 28
End: 2023-02-22
Attending: FAMILY MEDICINE
Payer: COMMERCIAL

## 2023-02-22 ENCOUNTER — CLINICAL SUPPORT (OUTPATIENT)
Dept: REHABILITATION | Facility: HOSPITAL | Age: 28
End: 2023-02-22
Payer: COMMERCIAL

## 2023-02-22 DIAGNOSIS — Z13.6 ENCOUNTER FOR LIPID SCREENING FOR CARDIOVASCULAR DISEASE: ICD-10-CM

## 2023-02-22 DIAGNOSIS — D50.0 IRON DEFICIENCY ANEMIA DUE TO CHRONIC BLOOD LOSS: ICD-10-CM

## 2023-02-22 DIAGNOSIS — M62.89 PELVIC FLOOR DYSFUNCTION: ICD-10-CM

## 2023-02-22 DIAGNOSIS — M62.89 MUSCLE HYPERTONICITY: ICD-10-CM

## 2023-02-22 DIAGNOSIS — Z13.220 ENCOUNTER FOR LIPID SCREENING FOR CARDIOVASCULAR DISEASE: ICD-10-CM

## 2023-02-22 DIAGNOSIS — Z00.00 ROUTINE MEDICAL EXAM: ICD-10-CM

## 2023-02-22 DIAGNOSIS — N39.41 URGE INCONTINENCE OF URINE: ICD-10-CM

## 2023-02-22 DIAGNOSIS — R35.0 URINE FREQUENCY: ICD-10-CM

## 2023-02-22 LAB
ALBUMIN SERPL BCP-MCNC: 3.6 G/DL (ref 3.5–5.2)
ALP SERPL-CCNC: 60 U/L (ref 55–135)
ALT SERPL W/O P-5'-P-CCNC: <5 U/L (ref 10–44)
ANION GAP SERPL CALC-SCNC: 10 MMOL/L (ref 8–16)
AST SERPL-CCNC: 16 U/L (ref 10–40)
BASOPHILS # BLD AUTO: 0.06 K/UL (ref 0–0.2)
BASOPHILS NFR BLD: 0.8 % (ref 0–1.9)
BILIRUB SERPL-MCNC: 0.3 MG/DL (ref 0.1–1)
BILIRUB UR QL STRIP: NEGATIVE
BUN SERPL-MCNC: 9 MG/DL (ref 6–20)
CALCIUM SERPL-MCNC: 9.4 MG/DL (ref 8.7–10.5)
CHLORIDE SERPL-SCNC: 106 MMOL/L (ref 95–110)
CHOLEST SERPL-MCNC: 159 MG/DL (ref 120–199)
CHOLEST/HDLC SERPL: 1.9 {RATIO} (ref 2–5)
CLARITY UR REFRACT.AUTO: CLEAR
CO2 SERPL-SCNC: 20 MMOL/L (ref 23–29)
COLOR UR AUTO: COLORLESS
CREAT SERPL-MCNC: 0.9 MG/DL (ref 0.5–1.4)
DIFFERENTIAL METHOD: ABNORMAL
EOSINOPHIL # BLD AUTO: 0.1 K/UL (ref 0–0.5)
EOSINOPHIL NFR BLD: 1.9 % (ref 0–8)
ERYTHROCYTE [DISTWIDTH] IN BLOOD BY AUTOMATED COUNT: 13.7 % (ref 11.5–14.5)
EST. GFR  (NO RACE VARIABLE): >60 ML/MIN/1.73 M^2
FERRITIN SERPL-MCNC: 10 NG/ML (ref 20–300)
GLUCOSE SERPL-MCNC: 83 MG/DL (ref 70–110)
GLUCOSE UR QL STRIP: NEGATIVE
HCT VFR BLD AUTO: 34.9 % (ref 37–48.5)
HDLC SERPL-MCNC: 82 MG/DL (ref 40–75)
HDLC SERPL: 51.6 % (ref 20–50)
HGB BLD-MCNC: 10.7 G/DL (ref 12–16)
HGB UR QL STRIP: NEGATIVE
IMM GRANULOCYTES # BLD AUTO: 0.01 K/UL (ref 0–0.04)
IMM GRANULOCYTES NFR BLD AUTO: 0.1 % (ref 0–0.5)
IRON SERPL-MCNC: 69 UG/DL (ref 30–160)
KETONES UR QL STRIP: NEGATIVE
LDLC SERPL CALC-MCNC: 69.4 MG/DL (ref 63–159)
LEUKOCYTE ESTERASE UR QL STRIP: NEGATIVE
LYMPHOCYTES # BLD AUTO: 1.6 K/UL (ref 1–4.8)
LYMPHOCYTES NFR BLD: 22.5 % (ref 18–48)
MCH RBC QN AUTO: 24.7 PG (ref 27–31)
MCHC RBC AUTO-ENTMCNC: 30.7 G/DL (ref 32–36)
MCV RBC AUTO: 80 FL (ref 82–98)
MONOCYTES # BLD AUTO: 0.6 K/UL (ref 0.3–1)
MONOCYTES NFR BLD: 7.9 % (ref 4–15)
NEUTROPHILS # BLD AUTO: 4.8 K/UL (ref 1.8–7.7)
NEUTROPHILS NFR BLD: 66.8 % (ref 38–73)
NITRITE UR QL STRIP: NEGATIVE
NONHDLC SERPL-MCNC: 77 MG/DL
NRBC BLD-RTO: 0 /100 WBC
PH UR STRIP: 6 [PH] (ref 5–8)
PLATELET # BLD AUTO: 288 K/UL (ref 150–450)
PMV BLD AUTO: 11.4 FL (ref 9.2–12.9)
POTASSIUM SERPL-SCNC: 4.5 MMOL/L (ref 3.5–5.1)
PROT SERPL-MCNC: 7 G/DL (ref 6–8.4)
PROT UR QL STRIP: NEGATIVE
RBC # BLD AUTO: 4.34 M/UL (ref 4–5.4)
SATURATED IRON: 12 % (ref 20–50)
SODIUM SERPL-SCNC: 136 MMOL/L (ref 136–145)
SP GR UR STRIP: 1 (ref 1–1.03)
TOTAL IRON BINDING CAPACITY: 589 UG/DL (ref 250–450)
TRANSFERRIN SERPL-MCNC: 398 MG/DL (ref 200–375)
TRIGL SERPL-MCNC: 38 MG/DL (ref 30–150)
URN SPEC COLLECT METH UR: ABNORMAL
WBC # BLD AUTO: 7.25 K/UL (ref 3.9–12.7)

## 2023-02-22 PROCEDURE — 80061 LIPID PANEL: CPT | Performed by: FAMILY MEDICINE

## 2023-02-22 PROCEDURE — 87086 URINE CULTURE/COLONY COUNT: CPT | Performed by: FAMILY MEDICINE

## 2023-02-22 PROCEDURE — 36415 COLL VENOUS BLD VENIPUNCTURE: CPT | Mod: PN | Performed by: FAMILY MEDICINE

## 2023-02-22 PROCEDURE — 82728 ASSAY OF FERRITIN: CPT | Performed by: FAMILY MEDICINE

## 2023-02-22 PROCEDURE — 80053 COMPREHEN METABOLIC PANEL: CPT | Performed by: FAMILY MEDICINE

## 2023-02-22 PROCEDURE — 84466 ASSAY OF TRANSFERRIN: CPT | Performed by: FAMILY MEDICINE

## 2023-02-22 PROCEDURE — 97162 PT EVAL MOD COMPLEX 30 MIN: CPT | Mod: PN

## 2023-02-22 PROCEDURE — 97530 THERAPEUTIC ACTIVITIES: CPT | Mod: PN

## 2023-02-22 PROCEDURE — 81003 URINALYSIS AUTO W/O SCOPE: CPT | Performed by: FAMILY MEDICINE

## 2023-02-22 PROCEDURE — 85025 COMPLETE CBC W/AUTO DIFF WBC: CPT | Performed by: FAMILY MEDICINE

## 2023-02-22 NOTE — PATIENT INSTRUCTIONS
Home Exercise Program: 02/22/2023    Hip and Pelvic Stretching Program                                               Hold 60 seconds, repeat 2 times.  Pull your knee close enough to your chest to keep your back flat.
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
## 2023-02-23 LAB — BACTERIA UR CULT: NO GROWTH
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
## 2023-03-02 ENCOUNTER — CLINICAL SUPPORT (OUTPATIENT)
Dept: REHABILITATION | Facility: HOSPITAL | Age: 28
End: 2023-03-02
Payer: COMMERCIAL

## 2023-03-02 DIAGNOSIS — M62.89 MUSCLE HYPERTONICITY: Primary | ICD-10-CM

## 2023-03-02 DIAGNOSIS — N39.41 URGE INCONTINENCE OF URINE: ICD-10-CM

## 2023-03-02 PROCEDURE — 97110 THERAPEUTIC EXERCISES: CPT | Mod: PN

## 2023-03-02 PROCEDURE — 97530 THERAPEUTIC ACTIVITIES: CPT | Mod: PN

## 2023-03-02 PROCEDURE — 97140 MANUAL THERAPY 1/> REGIONS: CPT | Mod: PN

## 2023-03-02 NOTE — PATIENT INSTRUCTIONS
Home Exercise Program: 03/02/2023    DILATOR TRAINING  1. Make sure the dilator is clean, free of any visible soiling.   2. Apply water-based lubricant (ie. Slippery stuff, coconut oil, olive oil) to the dilator and the vaginal opening.   3. Either sit on the toilet and lean back on the tank OR lay back with your back well supported by several pillows and both knees bent.   4. Spread labia and insert the dilator.    With the 1-2 size:  5. Apply downward pressure onto the levator ani group of muscles on one side.     To find the levator ani muscles:   - In the middle at 6 o'clock = Rectum   - Move a little to the right at 5 o'clock = Right levator ani group   - Move a little to the left at 7 o'clock = Left levator ani group   - A little more to the right at 3/4 o'clock = Right obturator internus muscle   - A little more to the left at 8/9 o'clock = Left obturator internus muscle   - If you feel sharp, stabbing pain = bone or pudendal nerve (wrong)  6. Begin Diaphragmatic Breathing and drop your pelvic floor muscle (releasing the tension), following the encouragement of the dilator.  7. Repeat on the levator ani group of muscles on the other side.  8. Continue for 5-10 minutes.    With the 3-4 size:  9. Insert the dilator until it begins to get moderately uncomfortable. Stop at this point.  10. Begin Diaphragmatic Breathing and focus on DROPPING the pelvic floor muscles, releasing their tension.   11. Once the discomfort has dissipated, insert the dilator a little bit more, and repeat this process.  12. You can also move the dilator slightly side/side or forward/backward to get the tissues use to being mobilized.   13. Continue for 5 minutes.     14. Once finished, remove dilator.  15. Wash with anti-bacterial soap and thoroughly rinse. Let air dry whenever possible. Store in a dry, clean location.   16. Repeat 5 of the 7 days per week.    *Don't apply so much pressure that it leaves you too sore to perform again the  next day.    STOP if there is increased bleeding, an infection, or extreme pain.      Home Exercise Program: 03/02/2023    CONTROLLING URINARY / FECAL URGENCY    What to do when you experience a strong urge to urinate or defecate:     FIRST  Stop activity, stand quietly or sit down. Try to stay very still to maintain control. Avoid rushing to the toilet.    SECOND Begin Quick Flicks (1 second LIFT of pelvic floor muscles, 4 second DROP). Pelvic floor contractions send a message to the bladder to relax and hold urine.     THIRD Relax. Do not rush to the toilet. Take a deep belly or diaphragmatic breath and let it out slowly. Let the urge to urinate pass by using distraction techniques and positive thoughts. Try not to think about going to the bathroom.     FINALLY If the urge returns, repeat the above steps to regain control. When you feel the urge subside, walk normally to the bathroom. You can urinate once the urge has subsided.        The urge feeling strikes!   Stop, breathe, and be still and then begin Quick Flicks     Do Not rush to the toilet.     Think positively and distract yourself.    Home Exercise Program: 03/02/2023    DOUBLE VOIDING - Double voiding is a technique that may assist the bladder to empty more effectively when  urine is left in the bladder. It involves passing  urine more than once each time that you go to  the toilet. This makes sure that the bladder is  completely empty.    Here are 3 strategies you can try to fully empty your bladder.  You do not have to do all of these things every single time. Find which ones work best for you.     Check to make sure your pelvic floor is relaxed   Do a body scan - make sure your legs, buttocks, and abdominals are relaxed.  Take a couple deep, slow breaths to encourage your pelvic floor muscles to DROP (ie. Try Diaphragmatic Breathing).  Gently apply pressure over your bladder.  Change your pelvic position: lean forward, rock your pelvis forward and  backward, stand up then sit back down.     Wait at least 30 seconds to 1 minute to see if a second urine stream begins.     Do not stop your urine stream or push/strain!

## 2023-03-07 DIAGNOSIS — D50.0 IRON DEFICIENCY ANEMIA DUE TO CHRONIC BLOOD LOSS: Primary | ICD-10-CM
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
## 2023-03-08 ENCOUNTER — CLINICAL SUPPORT (OUTPATIENT)
Dept: REHABILITATION | Facility: HOSPITAL | Age: 28
End: 2023-03-08
Payer: COMMERCIAL

## 2023-03-08 ENCOUNTER — TELEPHONE (OUTPATIENT)
Dept: HEMATOLOGY/ONCOLOGY | Facility: CLINIC | Age: 28
End: 2023-03-08
Payer: COMMERCIAL

## 2023-03-08 DIAGNOSIS — D50.0 IRON DEFICIENCY ANEMIA DUE TO CHRONIC BLOOD LOSS: Primary | ICD-10-CM

## 2023-03-08 DIAGNOSIS — D50.9 MICROCYTIC ANEMIA: ICD-10-CM

## 2023-03-08 DIAGNOSIS — M62.89 MUSCLE HYPERTONICITY: Primary | ICD-10-CM

## 2023-03-08 DIAGNOSIS — N39.41 URGE INCONTINENCE OF URINE: ICD-10-CM

## 2023-03-08 PROCEDURE — 97140 MANUAL THERAPY 1/> REGIONS: CPT | Mod: PN

## 2023-03-08 PROCEDURE — 97112 NEUROMUSCULAR REEDUCATION: CPT | Mod: PN

## 2023-03-08 PROCEDURE — 97110 THERAPEUTIC EXERCISES: CPT | Mod: PN

## 2023-03-08 NOTE — TELEPHONE ENCOUNTER
To Elaine novoa ma  Labs are entered Please schedule at lab in [place]  for April 10th anytime   She has had them schedule there before Tks

## 2023-03-16 ENCOUNTER — CLINICAL SUPPORT (OUTPATIENT)
Dept: REHABILITATION | Facility: HOSPITAL | Age: 28
End: 2023-03-16
Payer: COMMERCIAL

## 2023-03-16 DIAGNOSIS — N39.41 URGE INCONTINENCE OF URINE: ICD-10-CM

## 2023-03-16 DIAGNOSIS — M62.89 MUSCLE HYPERTONICITY: Primary | ICD-10-CM

## 2023-03-16 PROCEDURE — 97110 THERAPEUTIC EXERCISES: CPT | Mod: PN

## 2023-03-16 PROCEDURE — 97140 MANUAL THERAPY 1/> REGIONS: CPT | Mod: PN

## 2023-03-16 PROCEDURE — 97112 NEUROMUSCULAR REEDUCATION: CPT | Mod: PN
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
## 2023-03-21 ENCOUNTER — PATIENT MESSAGE (OUTPATIENT)
Dept: OBSTETRICS AND GYNECOLOGY | Facility: CLINIC | Age: 28
End: 2023-03-21
Payer: COMMERCIAL
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
## 2023-03-23 ENCOUNTER — CLINICAL SUPPORT (OUTPATIENT)
Dept: REHABILITATION | Facility: HOSPITAL | Age: 28
End: 2023-03-23
Payer: COMMERCIAL

## 2023-03-23 DIAGNOSIS — M62.89 MUSCLE HYPERTONICITY: Primary | ICD-10-CM

## 2023-03-23 DIAGNOSIS — N39.41 URGE INCONTINENCE OF URINE: ICD-10-CM

## 2023-03-23 PROCEDURE — 97140 MANUAL THERAPY 1/> REGIONS: CPT | Mod: PN

## 2023-03-23 PROCEDURE — 97110 THERAPEUTIC EXERCISES: CPT | Mod: PN

## 2023-03-23 PROCEDURE — 97530 THERAPEUTIC ACTIVITIES: CPT | Mod: PN

## 2023-03-23 PROCEDURE — 97112 NEUROMUSCULAR REEDUCATION: CPT | Mod: PN

## 2023-03-31 ENCOUNTER — OFFICE VISIT (OUTPATIENT)
Dept: OBSTETRICS AND GYNECOLOGY | Facility: CLINIC | Age: 28
End: 2023-03-31
Payer: COMMERCIAL

## 2023-03-31 VITALS
BODY MASS INDEX: 28.08 KG/M2 | WEIGHT: 163.56 LBS | DIASTOLIC BLOOD PRESSURE: 78 MMHG | SYSTOLIC BLOOD PRESSURE: 110 MMHG

## 2023-03-31 DIAGNOSIS — Z30.41 ORAL CONTRACEPTIVE PILL SURVEILLANCE: ICD-10-CM

## 2023-03-31 DIAGNOSIS — Z01.419 WELL WOMAN EXAM WITH ROUTINE GYNECOLOGICAL EXAM: Primary | ICD-10-CM

## 2023-03-31 DIAGNOSIS — Z86.018 HISTORY OF UTERINE FIBROID: ICD-10-CM

## 2023-03-31 PROCEDURE — 3008F PR BODY MASS INDEX (BMI) DOCUMENTED: ICD-10-PCS | Mod: CPTII,S$GLB,, | Performed by: OBSTETRICS & GYNECOLOGY

## 2023-03-31 PROCEDURE — 99395 PREV VISIT EST AGE 18-39: CPT | Mod: S$GLB,,, | Performed by: OBSTETRICS & GYNECOLOGY

## 2023-03-31 PROCEDURE — 99999 PR PBB SHADOW E&M-EST. PATIENT-LVL II: CPT | Mod: PBBFAC,,, | Performed by: OBSTETRICS & GYNECOLOGY

## 2023-03-31 PROCEDURE — 1159F MED LIST DOCD IN RCRD: CPT | Mod: CPTII,S$GLB,, | Performed by: OBSTETRICS & GYNECOLOGY

## 2023-03-31 PROCEDURE — 3074F PR MOST RECENT SYSTOLIC BLOOD PRESSURE < 130 MM HG: ICD-10-PCS | Mod: CPTII,S$GLB,, | Performed by: OBSTETRICS & GYNECOLOGY

## 2023-03-31 PROCEDURE — 3008F BODY MASS INDEX DOCD: CPT | Mod: CPTII,S$GLB,, | Performed by: OBSTETRICS & GYNECOLOGY

## 2023-03-31 PROCEDURE — 99999 PR PBB SHADOW E&M-EST. PATIENT-LVL II: ICD-10-PCS | Mod: PBBFAC,,, | Performed by: OBSTETRICS & GYNECOLOGY

## 2023-03-31 PROCEDURE — 3078F DIAST BP <80 MM HG: CPT | Mod: CPTII,S$GLB,, | Performed by: OBSTETRICS & GYNECOLOGY

## 2023-03-31 PROCEDURE — 3074F SYST BP LT 130 MM HG: CPT | Mod: CPTII,S$GLB,, | Performed by: OBSTETRICS & GYNECOLOGY

## 2023-03-31 PROCEDURE — 3078F PR MOST RECENT DIASTOLIC BLOOD PRESSURE < 80 MM HG: ICD-10-PCS | Mod: CPTII,S$GLB,, | Performed by: OBSTETRICS & GYNECOLOGY

## 2023-03-31 PROCEDURE — 1159F PR MEDICATION LIST DOCUMENTED IN MEDICAL RECORD: ICD-10-PCS | Mod: CPTII,S$GLB,, | Performed by: OBSTETRICS & GYNECOLOGY

## 2023-03-31 PROCEDURE — 99395 PR PREVENTIVE VISIT,EST,18-39: ICD-10-PCS | Mod: S$GLB,,, | Performed by: OBSTETRICS & GYNECOLOGY

## 2023-03-31 RX ORDER — NORGESTIMATE AND ETHINYL ESTRADIOL 0.25-0.035
1 KIT ORAL DAILY
Qty: 28 TABLET | Refills: 11 | Status: SHIPPED | OUTPATIENT
Start: 2023-03-31 | End: 2024-03-30
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
## 2023-04-04 ENCOUNTER — PATIENT MESSAGE (OUTPATIENT)
Dept: REHABILITATION | Facility: HOSPITAL | Age: 28
End: 2023-04-04

## 2023-04-04 ENCOUNTER — CLINICAL SUPPORT (OUTPATIENT)
Dept: REHABILITATION | Facility: HOSPITAL | Age: 28
End: 2023-04-04
Payer: COMMERCIAL

## 2023-04-04 DIAGNOSIS — M62.89 MUSCLE HYPERTONICITY: Primary | ICD-10-CM

## 2023-04-04 DIAGNOSIS — N39.41 URGE INCONTINENCE OF URINE: ICD-10-CM

## 2023-04-04 PROCEDURE — 97530 THERAPEUTIC ACTIVITIES: CPT | Mod: PN

## 2023-04-04 PROCEDURE — 97140 MANUAL THERAPY 1/> REGIONS: CPT | Mod: PN

## 2023-04-04 PROCEDURE — 97110 THERAPEUTIC EXERCISES: CPT | Mod: PN

## 2023-04-04 PROCEDURE — 97112 NEUROMUSCULAR REEDUCATION: CPT | Mod: PN

## 2023-04-05 ENCOUNTER — LAB VISIT (OUTPATIENT)
Dept: LAB | Facility: HOSPITAL | Age: 28
End: 2023-04-05
Attending: INTERNAL MEDICINE
Payer: COMMERCIAL

## 2023-04-05 DIAGNOSIS — D50.0 IRON DEFICIENCY ANEMIA DUE TO CHRONIC BLOOD LOSS: ICD-10-CM

## 2023-04-05 DIAGNOSIS — D50.9 MICROCYTIC ANEMIA: ICD-10-CM

## 2023-04-05 LAB
ALBUMIN SERPL BCP-MCNC: 3.7 G/DL (ref 3.5–5.2)
ALP SERPL-CCNC: 48 U/L (ref 55–135)
ALT SERPL W/O P-5'-P-CCNC: 6 U/L (ref 10–44)
ANION GAP SERPL CALC-SCNC: 11 MMOL/L (ref 8–16)
AST SERPL-CCNC: 15 U/L (ref 10–40)
BASOPHILS # BLD AUTO: 0.05 K/UL (ref 0–0.2)
BASOPHILS NFR BLD: 0.6 % (ref 0–1.9)
BILIRUB SERPL-MCNC: 0.3 MG/DL (ref 0.1–1)
BUN SERPL-MCNC: 9 MG/DL (ref 6–20)
CALCIUM SERPL-MCNC: 9.5 MG/DL (ref 8.7–10.5)
CHLORIDE SERPL-SCNC: 104 MMOL/L (ref 95–110)
CO2 SERPL-SCNC: 22 MMOL/L (ref 23–29)
CREAT SERPL-MCNC: 0.8 MG/DL (ref 0.5–1.4)
DIFFERENTIAL METHOD: ABNORMAL
EOSINOPHIL # BLD AUTO: 0.3 K/UL (ref 0–0.5)
EOSINOPHIL NFR BLD: 3.2 % (ref 0–8)
ERYTHROCYTE [DISTWIDTH] IN BLOOD BY AUTOMATED COUNT: 13.3 % (ref 11.5–14.5)
EST. GFR  (NO RACE VARIABLE): >60 ML/MIN/1.73 M^2
FERRITIN SERPL-MCNC: 9 NG/ML (ref 20–300)
GLUCOSE SERPL-MCNC: 114 MG/DL (ref 70–110)
HCT VFR BLD AUTO: 35.6 % (ref 37–48.5)
HGB BLD-MCNC: 11.3 G/DL (ref 12–16)
IMM GRANULOCYTES # BLD AUTO: 0.01 K/UL (ref 0–0.04)
IMM GRANULOCYTES NFR BLD AUTO: 0.1 % (ref 0–0.5)
IRON SERPL-MCNC: 53 UG/DL (ref 30–160)
IRON SERPL-MCNC: 53 UG/DL (ref 30–160)
LYMPHOCYTES # BLD AUTO: 1.7 K/UL (ref 1–4.8)
LYMPHOCYTES NFR BLD: 20 % (ref 18–48)
MCH RBC QN AUTO: 24.5 PG (ref 27–31)
MCHC RBC AUTO-ENTMCNC: 31.7 G/DL (ref 32–36)
MCV RBC AUTO: 77 FL (ref 82–98)
MONOCYTES # BLD AUTO: 0.5 K/UL (ref 0.3–1)
MONOCYTES NFR BLD: 5.5 % (ref 4–15)
NEUTROPHILS # BLD AUTO: 6.1 K/UL (ref 1.8–7.7)
NEUTROPHILS NFR BLD: 70.6 % (ref 38–73)
NRBC BLD-RTO: 0 /100 WBC
PLATELET # BLD AUTO: 293 K/UL (ref 150–450)
PMV BLD AUTO: 11 FL (ref 9.2–12.9)
POTASSIUM SERPL-SCNC: 3.7 MMOL/L (ref 3.5–5.1)
PROT SERPL-MCNC: 7.5 G/DL (ref 6–8.4)
RBC # BLD AUTO: 4.62 M/UL (ref 4–5.4)
SATURATED IRON: 8 % (ref 20–50)
SODIUM SERPL-SCNC: 137 MMOL/L (ref 136–145)
TOTAL IRON BINDING CAPACITY: 629 UG/DL (ref 250–450)
TRANSFERRIN SERPL-MCNC: 425 MG/DL (ref 200–375)
WBC # BLD AUTO: 8.67 K/UL (ref 3.9–12.7)

## 2023-04-05 PROCEDURE — 84466 ASSAY OF TRANSFERRIN: CPT | Performed by: INTERNAL MEDICINE

## 2023-04-05 PROCEDURE — 82728 ASSAY OF FERRITIN: CPT | Performed by: INTERNAL MEDICINE

## 2023-04-05 PROCEDURE — 80053 COMPREHEN METABOLIC PANEL: CPT | Performed by: INTERNAL MEDICINE

## 2023-04-05 PROCEDURE — 36415 COLL VENOUS BLD VENIPUNCTURE: CPT | Mod: PO | Performed by: INTERNAL MEDICINE

## 2023-04-05 PROCEDURE — 85025 COMPLETE CBC W/AUTO DIFF WBC: CPT | Performed by: INTERNAL MEDICINE

## 2023-04-10 ENCOUNTER — OFFICE VISIT (OUTPATIENT)
Dept: HEMATOLOGY/ONCOLOGY | Facility: CLINIC | Age: 28
End: 2023-04-10
Payer: COMMERCIAL

## 2023-04-10 VITALS
HEIGHT: 64 IN | OXYGEN SATURATION: 100 % | WEIGHT: 164.69 LBS | SYSTOLIC BLOOD PRESSURE: 137 MMHG | DIASTOLIC BLOOD PRESSURE: 90 MMHG | HEART RATE: 64 BPM | BODY MASS INDEX: 28.12 KG/M2

## 2023-04-10 DIAGNOSIS — D50.0 IRON DEFICIENCY ANEMIA DUE TO CHRONIC BLOOD LOSS: Primary | ICD-10-CM

## 2023-04-10 DIAGNOSIS — N92.0 MENORRHAGIA WITH REGULAR CYCLE: ICD-10-CM

## 2023-04-10 PROCEDURE — 99999 PR PBB SHADOW E&M-EST. PATIENT-LVL III: CPT | Mod: PBBFAC,,, | Performed by: INTERNAL MEDICINE

## 2023-04-10 PROCEDURE — 99999 PR PBB SHADOW E&M-EST. PATIENT-LVL III: ICD-10-PCS | Mod: PBBFAC,,, | Performed by: INTERNAL MEDICINE

## 2023-04-10 PROCEDURE — 3008F BODY MASS INDEX DOCD: CPT | Mod: CPTII,S$GLB,, | Performed by: INTERNAL MEDICINE

## 2023-04-10 PROCEDURE — 3075F PR MOST RECENT SYSTOLIC BLOOD PRESS GE 130-139MM HG: ICD-10-PCS | Mod: CPTII,S$GLB,, | Performed by: INTERNAL MEDICINE

## 2023-04-10 PROCEDURE — 99213 OFFICE O/P EST LOW 20 MIN: CPT | Mod: S$GLB,,, | Performed by: INTERNAL MEDICINE

## 2023-04-10 PROCEDURE — 99213 PR OFFICE/OUTPT VISIT, EST, LEVL III, 20-29 MIN: ICD-10-PCS | Mod: S$GLB,,, | Performed by: INTERNAL MEDICINE

## 2023-04-10 PROCEDURE — 3075F SYST BP GE 130 - 139MM HG: CPT | Mod: CPTII,S$GLB,, | Performed by: INTERNAL MEDICINE

## 2023-04-10 PROCEDURE — 3008F PR BODY MASS INDEX (BMI) DOCUMENTED: ICD-10-PCS | Mod: CPTII,S$GLB,, | Performed by: INTERNAL MEDICINE

## 2023-04-10 PROCEDURE — 3080F DIAST BP >= 90 MM HG: CPT | Mod: CPTII,S$GLB,, | Performed by: INTERNAL MEDICINE

## 2023-04-10 PROCEDURE — 3080F PR MOST RECENT DIASTOLIC BLOOD PRESSURE >= 90 MM HG: ICD-10-PCS | Mod: CPTII,S$GLB,, | Performed by: INTERNAL MEDICINE

## 2023-04-10 NOTE — PROGRESS NOTES
"Subjective:       Patient ID: Cuong Aguayo is a 27 y.o. female.    Chief Complaint: No chief complaint on file.  Diagnsosis" KARYNA   HPI   Patient is a 28 y/o female with medical diagnoses as listed seen today in consultation for KARYNA. She has a long standing hx of KARYNA. She was recently prescribed Ferrous sulfate bid OTC x  1 week. She has drug-induced constipation. She is followed by Ob/Gyn for hx of heavy menstrual cycles. She reports that she stopped OCPs after ovarian cyst ruptured. Her periods are monthly but last 5-7 days. She craves ice. No SOB/lightheadness. She reports progressive fatigue past year. No history of prbc transfusion. No fam hx of blood or bleeding d/o. No melena, hematochezia or change in bowel habits. No fam hx of colon cancer. CBC from 3/8/22 reveals wbc ct 6960/mm3 Hb 7g/dL Hct 26.3% MCV 61 plt ct 267k. Fe studies reveals Ferritin 7.       Lost to follow up   She undergoes Fe supp intermittently  She completed Fe supp last May 2022  She was last seen by Dr. Duran  No ice cravings  Mild fatigue  No SOB/CP    She reports OCP dosage recentlyh modified  LMP 2 days ago   She reports 4 days, heavy      Past Medical History:   Diagnosis Date    Depression     Iron deficiency anemia        No past surgical history on file.     Social History     Tobacco Use    Smoking status: Never    Smokeless tobacco: Never   Substance Use Topics    Alcohol use: No    Drug use: No       Review of Systems   Constitutional:  Positive for fatigue. Negative for appetite change, fever and unexpected weight change.   HENT:  Negative for mouth sores.    Eyes:  Negative for visual disturbance.   Respiratory:  Negative for cough and shortness of breath.    Cardiovascular:  Negative for chest pain.   Gastrointestinal:  Negative for abdominal pain and diarrhea.   Genitourinary:  Negative for frequency.   Musculoskeletal:  Negative for back pain.   Integumentary:  Negative for rash.   Neurological:  Negative for " "headaches.   Hematological:  Negative for adenopathy.   Psychiatric/Behavioral:  The patient is not nervous/anxious.        Objective:       Vitals:    04/10/23 1057   BP: (!) 137/90   BP Location: Left arm   Patient Position: Sitting   BP Method: Large (Automatic)   Pulse: 64   SpO2: 100%   Weight: 74.7 kg (164 lb 10.9 oz)   Height: 5' 4" (1.626 m)       Physical Exam  Constitutional:       Appearance: She is well-developed.   HENT:      Head: Normocephalic.      Right Ear: External ear normal.      Left Ear: External ear normal.      Mouth/Throat:      Pharynx: No oropharyngeal exudate.   Eyes:      General: No scleral icterus.        Right eye: No discharge.         Left eye: No discharge.      Conjunctiva/sclera: Conjunctivae normal.   Cardiovascular:      Rate and Rhythm: Normal rate and regular rhythm.      Heart sounds: Normal heart sounds. No murmur heard.  Pulmonary:      Effort: Pulmonary effort is normal.      Breath sounds: Normal breath sounds. No wheezing or rales.   Abdominal:      General: Bowel sounds are normal.      Palpations: Abdomen is soft.      Tenderness: There is no abdominal tenderness. There is no guarding or rebound.   Musculoskeletal:         General: Normal range of motion.      Cervical back: Normal range of motion and neck supple.      Right lower leg: No edema.      Left lower leg: No edema.   Skin:     Coloration: Skin is not jaundiced.      Findings: No rash.   Neurological:      General: No focal deficit present.      Mental Status: She is alert and oriented to person, place, and time.   Psychiatric:         Mood and Affect: Mood normal.         Behavior: Behavior normal.       Lab Results   Component Value Date    WBC 8.67 04/05/2023    HGB 11.3 (L) 04/05/2023    HCT 35.6 (L) 04/05/2023    MCV 77 (L) 04/05/2023     04/05/2023       Lab Results   Component Value Date    IRON 53 04/05/2023    IRON 53 04/05/2023    TIBC 629 (H) 04/05/2023    FERRITIN 9 (L) 04/05/2023 "       US pelvis 3/8/22   Impression:     1. Fibroid uterus.  2. Endometrial stripe at the upper limits of normal in thickness at a proximally 22 mm with some cystic changes seen within the endometrium.  Findings can be seen with endometrial hyperplasia.         Assessment:       1. Iron deficiency anemia due to chronic blood loss    2. Menorrhagia   3.      Menorrhagia      Plan:         Problem List Items Addressed This Visit          Oncology    Iron deficiency anemia - Primary  Labs reviewed  Restart Fe supp    Plan repeat cbc, Fe studies in 6 wks    Relevant Orders    CBC Auto Differential    Ferritin    Iron and TIBC          Other Visit Diagnoses       Menorrhagia    Followed by Gyn           CBC , FE studies in 6 weeks with televisit f/u     Cc: Pedro Mcdonough Jr., MD
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
## 2023-04-13 ENCOUNTER — CLINICAL SUPPORT (OUTPATIENT)
Dept: REHABILITATION | Facility: HOSPITAL | Age: 28
End: 2023-04-13
Payer: COMMERCIAL

## 2023-04-13 DIAGNOSIS — N39.41 URGE INCONTINENCE OF URINE: ICD-10-CM

## 2023-04-13 DIAGNOSIS — M62.89 MUSCLE HYPERTONICITY: Primary | ICD-10-CM

## 2023-04-13 PROCEDURE — 97112 NEUROMUSCULAR REEDUCATION: CPT | Mod: PN

## 2023-04-13 PROCEDURE — 97110 THERAPEUTIC EXERCISES: CPT | Mod: PN

## 2023-04-13 PROCEDURE — 97140 MANUAL THERAPY 1/> REGIONS: CPT | Mod: PN

## 2023-04-17 ENCOUNTER — HOSPITAL ENCOUNTER (OUTPATIENT)
Dept: RADIOLOGY | Facility: HOSPITAL | Age: 28
Discharge: HOME | End: 2023-04-17
Attending: OBSTETRICS & GYNECOLOGY
Payer: COMMERCIAL

## 2023-04-17 DIAGNOSIS — Z86.018 HISTORY OF UTERINE FIBROID: ICD-10-CM

## 2023-04-17 PROCEDURE — 76856 US EXAM PELVIC COMPLETE: CPT | Mod: TC,PO

## 2023-04-17 PROCEDURE — 76856 US EXAM PELVIC COMPLETE: CPT | Mod: 26,,, | Performed by: RADIOLOGY

## 2023-04-17 PROCEDURE — 76856 US PELVIS COMPLETE NON OB: ICD-10-PCS | Mod: 26,,, | Performed by: RADIOLOGY

## 2023-04-19 ENCOUNTER — CLINICAL SUPPORT (OUTPATIENT)
Dept: REHABILITATION | Facility: HOSPITAL | Age: 28
End: 2023-04-19
Payer: COMMERCIAL

## 2023-04-19 DIAGNOSIS — N39.41 URGE INCONTINENCE OF URINE: ICD-10-CM

## 2023-04-19 DIAGNOSIS — M62.89 MUSCLE HYPERTONICITY: Primary | ICD-10-CM

## 2023-04-19 PROCEDURE — 97140 MANUAL THERAPY 1/> REGIONS: CPT | Mod: PN

## 2023-04-19 PROCEDURE — 97112 NEUROMUSCULAR REEDUCATION: CPT | Mod: PN

## 2023-04-19 PROCEDURE — 97530 THERAPEUTIC ACTIVITIES: CPT | Mod: PN

## 2023-04-19 PROCEDURE — 97110 THERAPEUTIC EXERCISES: CPT | Mod: PN
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
## 2023-04-27 ENCOUNTER — CLINICAL SUPPORT (OUTPATIENT)
Dept: REHABILITATION | Facility: HOSPITAL | Age: 28
End: 2023-04-27
Payer: COMMERCIAL

## 2023-04-27 DIAGNOSIS — N39.41 URGE INCONTINENCE OF URINE: ICD-10-CM

## 2023-04-27 DIAGNOSIS — M62.89 MUSCLE HYPERTONICITY: Primary | ICD-10-CM

## 2023-04-27 PROCEDURE — 97140 MANUAL THERAPY 1/> REGIONS: CPT | Mod: PN

## 2023-04-27 PROCEDURE — 97530 THERAPEUTIC ACTIVITIES: CPT | Mod: PN

## 2023-04-27 PROCEDURE — 97110 THERAPEUTIC EXERCISES: CPT | Mod: PN

## 2023-04-27 PROCEDURE — 97112 NEUROMUSCULAR REEDUCATION: CPT | Mod: PN
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
## 2023-05-04 ENCOUNTER — CLINICAL SUPPORT (OUTPATIENT)
Dept: REHABILITATION | Facility: HOSPITAL | Age: 28
End: 2023-05-04
Payer: COMMERCIAL

## 2023-05-04 DIAGNOSIS — N39.41 URGE INCONTINENCE OF URINE: ICD-10-CM

## 2023-05-04 DIAGNOSIS — M62.89 MUSCLE HYPERTONICITY: Primary | ICD-10-CM

## 2023-05-04 PROCEDURE — 97530 THERAPEUTIC ACTIVITIES: CPT | Mod: PN

## 2023-05-04 PROCEDURE — 97110 THERAPEUTIC EXERCISES: CPT | Mod: PN

## 2023-05-04 PROCEDURE — 97140 MANUAL THERAPY 1/> REGIONS: CPT | Mod: PN

## 2023-05-04 PROCEDURE — 97112 NEUROMUSCULAR REEDUCATION: CPT | Mod: PN

## 2023-05-04 NOTE — PATIENT INSTRUCTIONS
"Home Exercise Program: 5/4/2023      "CHECK IN" WITH YOUR PELVIC FLOOR    Every hour, "check in" with your pelvic floor.  Is it tight and contracted?  Is it relaxed and dropped?    Take 10 seconds and try to release any tension in the pelvic floor muscles and external anal sphincter. You can try one or more of the following techniques.   Diaphragmatic Breathing  One quick flick  Mindfulness  Body scan   Gently pushing out your pelvic floor     Then return to your regular tasks.    Do this every hour throughout the day in any position.     "
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
## 2023-05-12 ENCOUNTER — CLINICAL SUPPORT (OUTPATIENT)
Dept: REHABILITATION | Facility: HOSPITAL | Age: 28
End: 2023-05-12
Payer: COMMERCIAL

## 2023-05-12 DIAGNOSIS — M62.89 MUSCLE HYPERTONICITY: Primary | ICD-10-CM

## 2023-05-12 DIAGNOSIS — N39.41 URGE INCONTINENCE OF URINE: ICD-10-CM

## 2023-05-12 PROCEDURE — 97530 THERAPEUTIC ACTIVITIES: CPT | Mod: PN

## 2023-05-12 PROCEDURE — 97140 MANUAL THERAPY 1/> REGIONS: CPT | Mod: PN

## 2023-05-12 PROCEDURE — 97110 THERAPEUTIC EXERCISES: CPT | Mod: PN

## 2023-05-12 PROCEDURE — 97112 NEUROMUSCULAR REEDUCATION: CPT | Mod: PN
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
## 2023-05-18 ENCOUNTER — CLINICAL SUPPORT (OUTPATIENT)
Dept: REHABILITATION | Facility: HOSPITAL | Age: 28
End: 2023-05-18
Payer: COMMERCIAL

## 2023-05-18 ENCOUNTER — LAB VISIT (OUTPATIENT)
Dept: LAB | Facility: HOSPITAL | Age: 28
End: 2023-05-18
Attending: INTERNAL MEDICINE
Payer: COMMERCIAL

## 2023-05-18 DIAGNOSIS — D50.0 IRON DEFICIENCY ANEMIA DUE TO CHRONIC BLOOD LOSS: ICD-10-CM

## 2023-05-18 DIAGNOSIS — N39.41 URGE INCONTINENCE OF URINE: ICD-10-CM

## 2023-05-18 DIAGNOSIS — M62.89 MUSCLE HYPERTONICITY: Primary | ICD-10-CM

## 2023-05-18 PROCEDURE — 85025 COMPLETE CBC W/AUTO DIFF WBC: CPT | Performed by: INTERNAL MEDICINE

## 2023-05-18 PROCEDURE — 36415 COLL VENOUS BLD VENIPUNCTURE: CPT | Mod: PO | Performed by: INTERNAL MEDICINE

## 2023-05-18 PROCEDURE — 97140 MANUAL THERAPY 1/> REGIONS: CPT | Mod: PN

## 2023-05-18 PROCEDURE — 82728 ASSAY OF FERRITIN: CPT | Performed by: INTERNAL MEDICINE

## 2023-05-18 PROCEDURE — 97112 NEUROMUSCULAR REEDUCATION: CPT | Mod: PN

## 2023-05-18 PROCEDURE — 97110 THERAPEUTIC EXERCISES: CPT | Mod: PN

## 2023-05-18 PROCEDURE — 84466 ASSAY OF TRANSFERRIN: CPT | Performed by: INTERNAL MEDICINE

## 2023-05-18 PROCEDURE — 97530 THERAPEUTIC ACTIVITIES: CPT | Mod: PN

## 2023-05-19 LAB
BASOPHILS # BLD AUTO: 0.04 K/UL (ref 0–0.2)
BASOPHILS NFR BLD: 0.5 % (ref 0–1.9)
DIFFERENTIAL METHOD: ABNORMAL
EOSINOPHIL # BLD AUTO: 0.1 K/UL (ref 0–0.5)
EOSINOPHIL NFR BLD: 1.5 % (ref 0–8)
ERYTHROCYTE [DISTWIDTH] IN BLOOD BY AUTOMATED COUNT: 15.6 % (ref 11.5–14.5)
FERRITIN SERPL-MCNC: 44 NG/ML (ref 20–300)
HCT VFR BLD AUTO: 40.9 % (ref 37–48.5)
HGB BLD-MCNC: 11.8 G/DL (ref 12–16)
IMM GRANULOCYTES # BLD AUTO: 0.02 K/UL (ref 0–0.04)
IMM GRANULOCYTES NFR BLD AUTO: 0.2 % (ref 0–0.5)
IRON SERPL-MCNC: 85 UG/DL (ref 30–160)
LYMPHOCYTES # BLD AUTO: 1.2 K/UL (ref 1–4.8)
LYMPHOCYTES NFR BLD: 14.1 % (ref 18–48)
MCH RBC QN AUTO: 24.2 PG (ref 27–31)
MCHC RBC AUTO-ENTMCNC: 28.9 G/DL (ref 32–36)
MCV RBC AUTO: 84 FL (ref 82–98)
MONOCYTES # BLD AUTO: 0.5 K/UL (ref 0.3–1)
MONOCYTES NFR BLD: 5.6 % (ref 4–15)
NEUTROPHILS # BLD AUTO: 6.6 K/UL (ref 1.8–7.7)
NEUTROPHILS NFR BLD: 78.1 % (ref 38–73)
NRBC BLD-RTO: 0 /100 WBC
PLATELET # BLD AUTO: 273 K/UL (ref 150–450)
PMV BLD AUTO: 11.4 FL (ref 9.2–12.9)
RBC # BLD AUTO: 4.88 M/UL (ref 4–5.4)
SATURATED IRON: 18 % (ref 20–50)
TOTAL IRON BINDING CAPACITY: 484 UG/DL (ref 250–450)
TRANSFERRIN SERPL-MCNC: 327 MG/DL (ref 200–375)
WBC # BLD AUTO: 8.43 K/UL (ref 3.9–12.7)

## 2023-05-23 ENCOUNTER — OFFICE VISIT (OUTPATIENT)
Dept: HEMATOLOGY/ONCOLOGY | Facility: CLINIC | Age: 28
End: 2023-05-23
Payer: COMMERCIAL

## 2023-05-23 DIAGNOSIS — D50.0 IRON DEFICIENCY ANEMIA DUE TO CHRONIC BLOOD LOSS: Primary | ICD-10-CM

## 2023-05-23 DIAGNOSIS — N92.0 MENORRHAGIA WITH REGULAR CYCLE: ICD-10-CM

## 2023-05-23 PROCEDURE — 99213 OFFICE O/P EST LOW 20 MIN: CPT | Mod: 95,,, | Performed by: INTERNAL MEDICINE

## 2023-05-23 PROCEDURE — 99213 PR OFFICE/OUTPT VISIT, EST, LEVL III, 20-29 MIN: ICD-10-PCS | Mod: 95,,, | Performed by: INTERNAL MEDICINE

## 2023-05-23 NOTE — PROGRESS NOTES
"Subjective:      The patient location is: home  The chief complaint leading to consultation is: KARYNA    Visit type: audiovisual    Face to Face time with patient: 5 min  8 minutes of total time spent on the encounter, which includes face to face time and non-face to face time preparing to see the patient (eg, review of tests), Obtaining and/or reviewing separately obtained history, Documenting clinical information in the electronic or other health record, Independently interpreting results (not separately reported) and communicating results to the patient/family/caregiver, or Care coordination (not separately reported).         Each patient to whom he or she provides medical services by telemedicine is:  (1) informed of the relationship between the physician and patient and the respective role of any other health care provider with respect to management of the patient; and (2) notified that he or she may decline to receive medical services by telemedicine and may withdraw from such care at any time.    Notes:             Patient ID: Cuong Aguayo is a 27 y.o. female.    Chief Complaint: No chief complaint on file.  Diagnosis" KARYNA   HPI   Patient is a 26 y/o female with medical diagnoses as listed seen today in consultation for KARYNA. She has a long standing hx of KARYNA. She was recently prescribed Ferrous sulfate bid OTC x  1 week. She has drug-induced constipation. She is followed by Ob/Gyn for hx of heavy menstrual cycles. She reports that she stopped OCPs after ovarian cyst ruptured. Her periods are monthly but last 5-7 days. She craves ice. No SOB/lightheadness. She reports progressive fatigue past year. No history of prbc transfusion. No fam hx of blood or bleeding d/o. No melena, hematochezia or change in bowel habits. No fam hx of colon cancer. CBC from 3/8/22 reveals wbc ct 6960/mm3 Hb 7g/dL Hct 26.3% MCV 61 plt ct 267k. Fe studies reveals Ferritin 7. She undergoes Fe supp intermittently. She was last seen by Dr." Roger      Previously Lost to follow up   She remains on Fe supp  Tolerating okay  No ice cravings  Less  fatigue  No SOB/CP    She undergoes  OCP dosage   She reports cycles  heavy      Past Medical History:   Diagnosis Date    Depression     Iron deficiency anemia        No past surgical history on file.     Social History     Tobacco Use    Smoking status: Never    Smokeless tobacco: Never   Substance Use Topics    Alcohol use: No    Drug use: No       Review of Systems   Constitutional:  Positive for fatigue (improved). Negative for appetite change, fever and unexpected weight change.   HENT:  Negative for mouth sores.    Eyes:  Negative for visual disturbance.   Respiratory:  Negative for cough and shortness of breath.    Cardiovascular:  Negative for chest pain.   Gastrointestinal:  Negative for abdominal pain and diarrhea.   Genitourinary:  Positive for menstrual irregularity. Negative for frequency.   Musculoskeletal:  Negative for back pain.   Integumentary:  Negative for rash.   Neurological:  Negative for headaches.   Hematological:  Negative for adenopathy.   Psychiatric/Behavioral:  The patient is not nervous/anxious.        Objective:       There were no vitals filed for this visit.    Televisit       Lab Results   Component Value Date    WBC 8.43 05/18/2023    HGB 11.8 (L) 05/18/2023    HCT 40.9 05/18/2023    MCV 84 05/18/2023     05/18/2023       Lab Results   Component Value Date    IRON 85 05/18/2023    TIBC 484 (H) 05/18/2023    FERRITIN 44 05/18/2023       US pelvis 3/8/22   Impression:     1. Fibroid uterus.  2. Endometrial stripe at the upper limits of normal in thickness at a proximally 22 mm with some cystic changes seen within the endometrium.  Findings can be seen with endometrial hyperplasia.         Assessment:       1. Iron deficiency anemia due to chronic blood loss    2. Menorrhagia         Plan:         Problem List Items Addressed This Visit          Oncology     Iron deficiency anemia - Primary  Labs reviewed  Fe studies improved  Cont Fe supp  Pt advised of potential drug-induced constipation    Plan repeat cbc, Fe studies in 6 wks    Relevant Orders    CBC Auto Differential    Ferritin    Iron and TIBC          Other Visit Diagnoses       Menorrhagia    Followed by Gyn           CBC , FE studies in 6 weeks with televisit f/u     Cc: Pedro Mcdonough Jr., MD
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
## 2023-05-25 ENCOUNTER — CLINICAL SUPPORT (OUTPATIENT)
Dept: REHABILITATION | Facility: HOSPITAL | Age: 28
End: 2023-05-25
Payer: COMMERCIAL

## 2023-05-25 DIAGNOSIS — N39.41 URGE INCONTINENCE OF URINE: ICD-10-CM

## 2023-05-25 DIAGNOSIS — M62.89 MUSCLE HYPERTONICITY: Primary | ICD-10-CM

## 2023-05-25 PROCEDURE — 97530 THERAPEUTIC ACTIVITIES: CPT | Mod: PN

## 2023-05-25 PROCEDURE — 97112 NEUROMUSCULAR REEDUCATION: CPT | Mod: PN

## 2023-05-25 PROCEDURE — 97140 MANUAL THERAPY 1/> REGIONS: CPT | Mod: PN
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
## 2023-06-08 ENCOUNTER — CLINICAL SUPPORT (OUTPATIENT)
Dept: REHABILITATION | Facility: HOSPITAL | Age: 28
End: 2023-06-08
Payer: COMMERCIAL

## 2023-06-08 DIAGNOSIS — N39.41 URGE INCONTINENCE OF URINE: ICD-10-CM

## 2023-06-08 DIAGNOSIS — M62.89 MUSCLE HYPERTONICITY: Primary | ICD-10-CM

## 2023-06-08 PROCEDURE — 97110 THERAPEUTIC EXERCISES: CPT | Mod: PN

## 2023-06-08 PROCEDURE — 97140 MANUAL THERAPY 1/> REGIONS: CPT | Mod: PN

## 2023-06-08 PROCEDURE — 97112 NEUROMUSCULAR REEDUCATION: CPT | Mod: PN

## 2023-06-08 PROCEDURE — 97530 THERAPEUTIC ACTIVITIES: CPT | Mod: PN
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
## 2023-06-12 ENCOUNTER — CLINICAL SUPPORT (OUTPATIENT)
Dept: REHABILITATION | Facility: HOSPITAL | Age: 28
End: 2023-06-12
Payer: COMMERCIAL

## 2023-06-12 DIAGNOSIS — M62.89 MUSCLE HYPERTONICITY: Primary | ICD-10-CM

## 2023-06-12 DIAGNOSIS — N39.41 URGE INCONTINENCE OF URINE: ICD-10-CM

## 2023-06-12 PROCEDURE — 97112 NEUROMUSCULAR REEDUCATION: CPT | Mod: PN

## 2023-06-12 PROCEDURE — 97530 THERAPEUTIC ACTIVITIES: CPT | Mod: PN

## 2023-06-12 PROCEDURE — 97140 MANUAL THERAPY 1/> REGIONS: CPT | Mod: PN
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
## 2023-06-19 ENCOUNTER — CLINICAL SUPPORT (OUTPATIENT)
Dept: REHABILITATION | Facility: HOSPITAL | Age: 28
End: 2023-06-19
Payer: COMMERCIAL

## 2023-06-19 DIAGNOSIS — N39.41 URGE INCONTINENCE OF URINE: ICD-10-CM

## 2023-06-19 DIAGNOSIS — M62.89 MUSCLE HYPERTONICITY: Primary | ICD-10-CM

## 2023-06-19 PROCEDURE — 97140 MANUAL THERAPY 1/> REGIONS: CPT | Mod: PN

## 2023-06-19 PROCEDURE — 97110 THERAPEUTIC EXERCISES: CPT | Mod: PN

## 2023-06-19 PROCEDURE — 97530 THERAPEUTIC ACTIVITIES: CPT | Mod: PN

## 2023-06-19 PROCEDURE — 97112 NEUROMUSCULAR REEDUCATION: CPT | Mod: PN

## 2023-06-23 ENCOUNTER — PATIENT MESSAGE (OUTPATIENT)
Dept: OBSTETRICS AND GYNECOLOGY | Facility: CLINIC | Age: 28
End: 2023-06-23
Payer: COMMERCIAL
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
## 2023-06-28 ENCOUNTER — CLINICAL SUPPORT (OUTPATIENT)
Dept: REHABILITATION | Facility: HOSPITAL | Age: 28
End: 2023-06-28
Payer: COMMERCIAL

## 2023-06-28 DIAGNOSIS — M62.89 MUSCLE HYPERTONICITY: Primary | ICD-10-CM

## 2023-06-28 DIAGNOSIS — N39.41 URGE INCONTINENCE OF URINE: ICD-10-CM

## 2023-06-28 PROCEDURE — 97112 NEUROMUSCULAR REEDUCATION: CPT | Mod: PN

## 2023-06-28 PROCEDURE — 97140 MANUAL THERAPY 1/> REGIONS: CPT | Mod: PN

## 2023-06-28 PROCEDURE — 97530 THERAPEUTIC ACTIVITIES: CPT | Mod: PN

## 2023-06-28 PROCEDURE — 97110 THERAPEUTIC EXERCISES: CPT | Mod: PN
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
## 2023-07-06 ENCOUNTER — CLINICAL SUPPORT (OUTPATIENT)
Dept: REHABILITATION | Facility: HOSPITAL | Age: 28
End: 2023-07-06
Payer: COMMERCIAL

## 2023-07-06 DIAGNOSIS — N39.41 URGE INCONTINENCE OF URINE: ICD-10-CM

## 2023-07-06 DIAGNOSIS — M62.89 MUSCLE HYPERTONICITY: Primary | ICD-10-CM

## 2023-07-06 PROCEDURE — 97530 THERAPEUTIC ACTIVITIES: CPT | Mod: PN

## 2023-07-06 PROCEDURE — 97110 THERAPEUTIC EXERCISES: CPT | Mod: PN

## 2023-07-06 PROCEDURE — 97140 MANUAL THERAPY 1/> REGIONS: CPT | Mod: PN

## 2023-07-06 PROCEDURE — 97112 NEUROMUSCULAR REEDUCATION: CPT | Mod: PN
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
## 2023-07-12 ENCOUNTER — CLINICAL SUPPORT (OUTPATIENT)
Dept: REHABILITATION | Facility: HOSPITAL | Age: 28
End: 2023-07-12
Payer: COMMERCIAL

## 2023-07-12 DIAGNOSIS — N39.41 URGE INCONTINENCE OF URINE: ICD-10-CM

## 2023-07-12 DIAGNOSIS — M62.89 MUSCLE HYPERTONICITY: Primary | ICD-10-CM

## 2023-07-12 PROCEDURE — 97112 NEUROMUSCULAR REEDUCATION: CPT | Mod: PN

## 2023-07-12 PROCEDURE — 97530 THERAPEUTIC ACTIVITIES: CPT | Mod: PN

## 2023-07-12 PROCEDURE — 97110 THERAPEUTIC EXERCISES: CPT | Mod: PN

## 2023-07-12 PROCEDURE — 97140 MANUAL THERAPY 1/> REGIONS: CPT | Mod: PN

## 2023-07-31 ENCOUNTER — CLINICAL SUPPORT (OUTPATIENT)
Dept: REHABILITATION | Facility: HOSPITAL | Age: 28
End: 2023-07-31
Payer: COMMERCIAL

## 2023-07-31 DIAGNOSIS — N39.41 URGE INCONTINENCE OF URINE: ICD-10-CM

## 2023-07-31 DIAGNOSIS — M62.89 MUSCLE HYPERTONICITY: Primary | ICD-10-CM

## 2023-07-31 PROCEDURE — 97530 THERAPEUTIC ACTIVITIES: CPT | Mod: PN

## 2023-07-31 PROCEDURE — 97140 MANUAL THERAPY 1/> REGIONS: CPT | Mod: PN

## 2023-07-31 PROCEDURE — 97112 NEUROMUSCULAR REEDUCATION: CPT | Mod: PN

## 2023-07-31 PROCEDURE — 97110 THERAPEUTIC EXERCISES: CPT | Mod: PN
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
## 2023-08-09 ENCOUNTER — PATIENT MESSAGE (OUTPATIENT)
Dept: FAMILY MEDICINE | Facility: CLINIC | Age: 28
End: 2023-08-09
Payer: COMMERCIAL

## 2023-08-09 ENCOUNTER — CLINICAL SUPPORT (OUTPATIENT)
Dept: REHABILITATION | Facility: HOSPITAL | Age: 28
End: 2023-08-09
Payer: COMMERCIAL

## 2023-08-09 DIAGNOSIS — N39.41 URGE INCONTINENCE OF URINE: ICD-10-CM

## 2023-08-09 DIAGNOSIS — M62.89 MUSCLE HYPERTONICITY: Primary | ICD-10-CM

## 2023-08-09 PROCEDURE — 97140 MANUAL THERAPY 1/> REGIONS: CPT | Mod: PN

## 2023-08-09 PROCEDURE — 97530 THERAPEUTIC ACTIVITIES: CPT | Mod: PN

## 2023-08-09 PROCEDURE — 97112 NEUROMUSCULAR REEDUCATION: CPT | Mod: PN

## 2023-08-09 PROCEDURE — 97110 THERAPEUTIC EXERCISES: CPT | Mod: PN
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
## 2023-08-17 ENCOUNTER — CLINICAL SUPPORT (OUTPATIENT)
Dept: REHABILITATION | Facility: HOSPITAL | Age: 28
End: 2023-08-17
Payer: COMMERCIAL

## 2023-08-17 ENCOUNTER — OFFICE VISIT (OUTPATIENT)
Dept: FAMILY MEDICINE | Facility: CLINIC | Age: 28
End: 2023-08-17
Payer: COMMERCIAL

## 2023-08-17 DIAGNOSIS — L20.82 FLEXURAL ECZEMA: Chronic | ICD-10-CM

## 2023-08-17 DIAGNOSIS — M62.89 MUSCLE HYPERTONICITY: Primary | ICD-10-CM

## 2023-08-17 DIAGNOSIS — M62.89 PELVIC FLOOR DYSFUNCTION: Primary | Chronic | ICD-10-CM

## 2023-08-17 DIAGNOSIS — N39.41 URGE INCONTINENCE OF URINE: ICD-10-CM

## 2023-08-17 PROCEDURE — 97140 MANUAL THERAPY 1/> REGIONS: CPT | Mod: PN

## 2023-08-17 PROCEDURE — 97112 NEUROMUSCULAR REEDUCATION: CPT | Mod: PN

## 2023-08-17 PROCEDURE — 1160F PR REVIEW ALL MEDS BY PRESCRIBER/CLIN PHARMACIST DOCUMENTED: ICD-10-PCS | Mod: CPTII,95,, | Performed by: FAMILY MEDICINE

## 2023-08-17 PROCEDURE — 1160F RVW MEDS BY RX/DR IN RCRD: CPT | Mod: CPTII,95,, | Performed by: FAMILY MEDICINE

## 2023-08-17 PROCEDURE — 99214 PR OFFICE/OUTPT VISIT, EST, LEVL IV, 30-39 MIN: ICD-10-PCS | Mod: 95,,, | Performed by: FAMILY MEDICINE

## 2023-08-17 PROCEDURE — 97530 THERAPEUTIC ACTIVITIES: CPT | Mod: PN

## 2023-08-17 PROCEDURE — 99214 OFFICE O/P EST MOD 30 MIN: CPT | Mod: 95,,, | Performed by: FAMILY MEDICINE

## 2023-08-17 PROCEDURE — 1159F PR MEDICATION LIST DOCUMENTED IN MEDICAL RECORD: ICD-10-PCS | Mod: CPTII,95,, | Performed by: FAMILY MEDICINE

## 2023-08-17 PROCEDURE — 1159F MED LIST DOCD IN RCRD: CPT | Mod: CPTII,95,, | Performed by: FAMILY MEDICINE

## 2023-08-17 PROCEDURE — 97110 THERAPEUTIC EXERCISES: CPT | Mod: PN

## 2023-08-17 RX ORDER — BACLOFEN 10 MG/1
10 TABLET ORAL 3 TIMES DAILY
Qty: 90 TABLET | Refills: 11 | Status: SHIPPED | OUTPATIENT
Start: 2023-08-17 | End: 2023-08-17 | Stop reason: SDUPTHER

## 2023-08-17 RX ORDER — TRIAMCINOLONE ACETONIDE 1 MG/G
OINTMENT TOPICAL 2 TIMES DAILY
Qty: 30 G | Refills: 2 | Status: SHIPPED | OUTPATIENT
Start: 2023-08-17 | End: 2024-01-18 | Stop reason: SDUPTHER

## 2023-08-17 RX ORDER — BACLOFEN 10 MG/1
10 TABLET ORAL DAILY PRN
Qty: 30 TABLET | Refills: 2 | Status: SHIPPED | OUTPATIENT
Start: 2023-08-17
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
## 2023-08-20 ENCOUNTER — PATIENT MESSAGE (OUTPATIENT)
Dept: FAMILY MEDICINE | Facility: CLINIC | Age: 28
End: 2023-08-20
Payer: COMMERCIAL

## 2023-08-20 DIAGNOSIS — M62.89 PELVIC FLOOR DYSFUNCTION: Primary | Chronic | ICD-10-CM

## 2023-08-20 PROBLEM — L20.82 FLEXURAL ECZEMA: Chronic | Status: ACTIVE | Noted: 2023-08-20

## 2023-08-20 NOTE — ASSESSMENT & PLAN NOTE
Discussed various options.   Will proceed with oral baclofen to see if this helps with symptoms, without significant side effects.  If unable to tolerate or little/no benefit, consider topical baclofen and/or diazepam.  Will message patient in a few weeks.

## 2023-08-23 ENCOUNTER — CLINICAL SUPPORT (OUTPATIENT)
Dept: REHABILITATION | Facility: HOSPITAL | Age: 28
End: 2023-08-23
Payer: COMMERCIAL

## 2023-08-23 DIAGNOSIS — N39.41 URGE INCONTINENCE OF URINE: ICD-10-CM

## 2023-08-23 DIAGNOSIS — M62.89 MUSCLE HYPERTONICITY: Primary | Chronic | ICD-10-CM

## 2023-08-23 PROCEDURE — 97530 THERAPEUTIC ACTIVITIES: CPT | Mod: PN

## 2023-08-23 PROCEDURE — 97140 MANUAL THERAPY 1/> REGIONS: CPT | Mod: PN

## 2023-08-23 PROCEDURE — 97112 NEUROMUSCULAR REEDUCATION: CPT | Mod: PN
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
## 2023-08-30 ENCOUNTER — CLINICAL SUPPORT (OUTPATIENT)
Dept: REHABILITATION | Facility: HOSPITAL | Age: 28
End: 2023-08-30
Payer: COMMERCIAL

## 2023-08-30 DIAGNOSIS — N39.41 URGE INCONTINENCE OF URINE: ICD-10-CM

## 2023-08-30 DIAGNOSIS — M62.89 MUSCLE HYPERTONICITY: Primary | Chronic | ICD-10-CM

## 2023-08-30 PROCEDURE — 97112 NEUROMUSCULAR REEDUCATION: CPT | Mod: PN

## 2023-08-30 PROCEDURE — 97140 MANUAL THERAPY 1/> REGIONS: CPT | Mod: PN

## 2023-08-30 PROCEDURE — 97110 THERAPEUTIC EXERCISES: CPT | Mod: PN

## 2023-08-30 PROCEDURE — 97530 THERAPEUTIC ACTIVITIES: CPT | Mod: PN
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
## 2023-08-31 DIAGNOSIS — M62.89 PELVIC FLOOR DYSFUNCTION: Chronic | ICD-10-CM

## 2023-08-31 NOTE — TELEPHONE ENCOUNTER
----- Message from Lay King sent at 8/31/2023  1:36 PM CDT -----  Type: RX Refill Request    Who Called: Self     Have you contacted your pharmacy:Yes     Refill or New Rx:REFILL     RX Name and Strength: CMPD baclofen 2%- amitriptyline 2%- LIDOcaine 5% cream (VULVODYNIA CREAM) 1 Tube     Preferred Pharmacy with phone number:[facility] [place] - [address]   Phone: [phone]  Fax: [phone]  Fax: [phone]    Local or Mail Order: Local     Would the patient rather a call back or a response via My [facility]? CALL     Best Call Back Number:[phone] (home)     Additional Information: Says Pharmacy checked they system and stated nothing is going on mentioned they have a additional fax number that provider can use if not states provider can call it in over the phone ...

## 2023-08-31 NOTE — TELEPHONE ENCOUNTER
Patient's e-prescribed prescription did not go through. I reached out to pharmacy and was able to call script in over the phone. Pharmacy states that they will get it started and reach out to patient when script is ready.

## 2023-08-31 NOTE — TELEPHONE ENCOUNTER
----- Message from Shiloh Allen sent at 8/31/2023  1:23 PM CDT -----  Type: RX Refill Request    Who Called:  self     Have you contacted your pharmacy: no    Refill or New Rx: refill    RX Name and Strength: CMPD baclofen 2%- amitriptyline 2%- LIDOcaine 5% cream (VULVODYNIA CREAM)      Preferred Pharmacy with phone number: [facility] [address] [address]   Phone:  [phone]  Fax:  [phone]    Local or Mail Order: local    Would the patient rather a call back or a response via My [facility]?  call    Best Call Back Number: .[phone] (home)      Additional Information:  do not receive script
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
## 2023-09-07 ENCOUNTER — CLINICAL SUPPORT (OUTPATIENT)
Dept: REHABILITATION | Facility: HOSPITAL | Age: 28
End: 2023-09-07
Payer: COMMERCIAL

## 2023-09-07 DIAGNOSIS — M62.89 MUSCLE HYPERTONICITY: Primary | Chronic | ICD-10-CM

## 2023-09-07 DIAGNOSIS — N39.41 URGE INCONTINENCE OF URINE: ICD-10-CM

## 2023-09-07 PROCEDURE — 97140 MANUAL THERAPY 1/> REGIONS: CPT | Mod: PN

## 2023-09-07 PROCEDURE — 97110 THERAPEUTIC EXERCISES: CPT | Mod: PN

## 2023-09-07 PROCEDURE — 97112 NEUROMUSCULAR REEDUCATION: CPT | Mod: PN
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
## 2023-09-27 ENCOUNTER — CLINICAL SUPPORT (OUTPATIENT)
Dept: REHABILITATION | Facility: HOSPITAL | Age: 28
End: 2023-09-27
Payer: COMMERCIAL

## 2023-09-27 DIAGNOSIS — N39.41 URGE INCONTINENCE OF URINE: ICD-10-CM

## 2023-09-27 DIAGNOSIS — M62.89 MUSCLE HYPERTONICITY: Primary | Chronic | ICD-10-CM

## 2023-09-27 PROCEDURE — 97140 MANUAL THERAPY 1/> REGIONS: CPT | Mod: PN

## 2023-09-27 PROCEDURE — 97112 NEUROMUSCULAR REEDUCATION: CPT | Mod: PN

## 2023-09-27 PROCEDURE — 97530 THERAPEUTIC ACTIVITIES: CPT | Mod: PN

## 2023-10-12 ENCOUNTER — PATIENT MESSAGE (OUTPATIENT)
Dept: REHABILITATION | Facility: HOSPITAL | Age: 28
End: 2023-10-12
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
## 2023-10-17 ENCOUNTER — CLINICAL SUPPORT (OUTPATIENT)
Dept: REHABILITATION | Facility: HOSPITAL | Age: 28
End: 2023-10-17
Payer: COMMERCIAL

## 2023-10-17 DIAGNOSIS — N39.41 URGE INCONTINENCE OF URINE: ICD-10-CM

## 2023-10-17 DIAGNOSIS — M62.89 MUSCLE HYPERTONICITY: Primary | Chronic | ICD-10-CM

## 2023-10-17 PROCEDURE — 97112 NEUROMUSCULAR REEDUCATION: CPT | Mod: PN

## 2023-10-17 PROCEDURE — 97530 THERAPEUTIC ACTIVITIES: CPT | Mod: PN

## 2023-10-17 PROCEDURE — 97140 MANUAL THERAPY 1/> REGIONS: CPT | Mod: PN
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
## 2023-10-19 ENCOUNTER — CLINICAL SUPPORT (OUTPATIENT)
Dept: REHABILITATION | Facility: HOSPITAL | Age: 28
End: 2023-10-19
Payer: COMMERCIAL

## 2023-10-19 DIAGNOSIS — N39.41 URGE INCONTINENCE OF URINE: ICD-10-CM

## 2023-10-19 DIAGNOSIS — M62.89 MUSCLE HYPERTONICITY: Primary | Chronic | ICD-10-CM

## 2023-10-19 PROCEDURE — 97530 THERAPEUTIC ACTIVITIES: CPT | Mod: PN

## 2023-10-19 PROCEDURE — 97140 MANUAL THERAPY 1/> REGIONS: CPT | Mod: PN

## 2023-10-19 PROCEDURE — 97112 NEUROMUSCULAR REEDUCATION: CPT | Mod: PN
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
## 2023-10-25 ENCOUNTER — CLINICAL SUPPORT (OUTPATIENT)
Dept: REHABILITATION | Facility: HOSPITAL | Age: 28
End: 2023-10-25
Payer: COMMERCIAL

## 2023-10-25 DIAGNOSIS — N39.41 URGE INCONTINENCE OF URINE: ICD-10-CM

## 2023-10-25 DIAGNOSIS — M62.89 MUSCLE HYPERTONICITY: Primary | Chronic | ICD-10-CM

## 2023-10-25 PROCEDURE — 97140 MANUAL THERAPY 1/> REGIONS: CPT | Mod: PN

## 2023-10-25 PROCEDURE — 97530 THERAPEUTIC ACTIVITIES: CPT | Mod: PN

## 2023-10-25 PROCEDURE — 97112 NEUROMUSCULAR REEDUCATION: CPT | Mod: PN

## 2023-10-30 ENCOUNTER — CLINICAL SUPPORT (OUTPATIENT)
Dept: REHABILITATION | Facility: HOSPITAL | Age: 28
End: 2023-10-30
Payer: COMMERCIAL

## 2023-10-30 DIAGNOSIS — M62.89 MUSCLE HYPERTONICITY: Primary | Chronic | ICD-10-CM

## 2023-10-30 DIAGNOSIS — N39.41 URGE INCONTINENCE OF URINE: ICD-10-CM

## 2023-10-30 PROCEDURE — 97140 MANUAL THERAPY 1/> REGIONS: CPT | Mod: PN

## 2023-10-30 PROCEDURE — 97110 THERAPEUTIC EXERCISES: CPT | Mod: PN

## 2023-10-30 PROCEDURE — 97112 NEUROMUSCULAR REEDUCATION: CPT | Mod: PN

## 2023-10-30 PROCEDURE — 97530 THERAPEUTIC ACTIVITIES: CPT | Mod: PN
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
## 2023-11-07 ENCOUNTER — CLINICAL SUPPORT (OUTPATIENT)
Dept: REHABILITATION | Facility: HOSPITAL | Age: 28
End: 2023-11-07
Payer: COMMERCIAL

## 2023-11-07 DIAGNOSIS — M62.89 MUSCLE HYPERTONICITY: Primary | Chronic | ICD-10-CM

## 2023-11-07 DIAGNOSIS — N39.41 URGE INCONTINENCE OF URINE: ICD-10-CM

## 2023-11-07 PROCEDURE — 97110 THERAPEUTIC EXERCISES: CPT | Mod: PN

## 2023-11-07 PROCEDURE — 97140 MANUAL THERAPY 1/> REGIONS: CPT | Mod: PN

## 2023-11-07 PROCEDURE — 97112 NEUROMUSCULAR REEDUCATION: CPT | Mod: PN

## 2023-11-07 PROCEDURE — 97530 THERAPEUTIC ACTIVITIES: CPT | Mod: PN
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
## 2023-11-14 ENCOUNTER — CLINICAL SUPPORT (OUTPATIENT)
Dept: REHABILITATION | Facility: HOSPITAL | Age: 28
End: 2023-11-14
Payer: COMMERCIAL

## 2023-11-14 DIAGNOSIS — N39.41 URGE INCONTINENCE OF URINE: ICD-10-CM

## 2023-11-14 DIAGNOSIS — M62.89 MUSCLE HYPERTONICITY: Primary | Chronic | ICD-10-CM

## 2023-11-14 PROCEDURE — 97112 NEUROMUSCULAR REEDUCATION: CPT | Mod: PN

## 2023-11-14 PROCEDURE — 97140 MANUAL THERAPY 1/> REGIONS: CPT | Mod: PN

## 2023-11-14 PROCEDURE — 97530 THERAPEUTIC ACTIVITIES: CPT | Mod: PN

## 2023-11-21 ENCOUNTER — CLINICAL SUPPORT (OUTPATIENT)
Dept: REHABILITATION | Facility: HOSPITAL | Age: 28
End: 2023-11-21
Payer: COMMERCIAL

## 2023-11-21 DIAGNOSIS — M62.89 MUSCLE HYPERTONICITY: Primary | Chronic | ICD-10-CM

## 2023-11-21 DIAGNOSIS — N39.41 URGE INCONTINENCE OF URINE: ICD-10-CM

## 2023-11-21 PROCEDURE — 97530 THERAPEUTIC ACTIVITIES: CPT | Mod: PN

## 2023-11-21 PROCEDURE — 97112 NEUROMUSCULAR REEDUCATION: CPT | Mod: PN

## 2023-11-21 PROCEDURE — 97140 MANUAL THERAPY 1/> REGIONS: CPT | Mod: PN
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
## 2023-11-22 NOTE — PATIENT INSTRUCTIONS
How to Use a Pelvic Wand   Step 1  Decide which end to use. The curved end with the pointed tip is helpful for addressing trigger points closer to the entrance of the vagina or rectum. The longer, rounded end is often helpful for reaching deep tender points in the rectum or vagina.  Step 4  Begin your session by breathing in and allowing your belly to expand, followed by exhaling, allowing your belly to slowly fall. The act of slowly exhaling helps to naturally relax the pelvic floor muscles. Repeat the deep breathing pattern, and continue to do so steadily and deliberately. Gently bring the wand to the opening of the vagina and carefully insert it on an exhale.  Step 2  Use a generous amount of water-based lubricant on the first 1 to 2 inches of the desired treatment end of the wand, as well as the opening of the vagina. The use of a water-based lubricant is important to preserve the medical grade silicone of the wand.  Step 5  Gently sweep the end of the wand until you encounter a tender point. When you find tender point, gently compress the end of the wand into the tender point with the same firmness you would use to check a tomato for ripeness. For example, don't press so hard you squish your tomato.  Step 3  Start by lying on your back with your knees bent and feet planted. Some people may prefer to lie on their side instead. If that's the case, be sure to bend your knees and support your top leg with a folded pillow between your knees.  Step 6  Maintain gentle pressure on the tender point and slowly move your bent knee left and right until you find a position that stops the pain in the pelvic floor muscle. When you find this position, remain there for 1 to 2 minutes to allow the tender point to fully release. Continue to breathe deeply.  Repeat this process 1 or 2 times per day as needed.

## 2023-12-06 ENCOUNTER — CLINICAL SUPPORT (OUTPATIENT)
Dept: REHABILITATION | Facility: HOSPITAL | Age: 28
End: 2023-12-06
Payer: COMMERCIAL

## 2023-12-06 DIAGNOSIS — M62.89 MUSCLE HYPERTONICITY: Primary | Chronic | ICD-10-CM

## 2023-12-06 DIAGNOSIS — N39.41 URGE INCONTINENCE OF URINE: ICD-10-CM

## 2023-12-06 PROCEDURE — 97112 NEUROMUSCULAR REEDUCATION: CPT | Mod: PN

## 2023-12-06 PROCEDURE — 97140 MANUAL THERAPY 1/> REGIONS: CPT | Mod: PN

## 2023-12-06 PROCEDURE — 97530 THERAPEUTIC ACTIVITIES: CPT | Mod: PN
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
## 2023-12-14 ENCOUNTER — CLINICAL SUPPORT (OUTPATIENT)
Dept: REHABILITATION | Facility: HOSPITAL | Age: 28
End: 2023-12-14
Payer: COMMERCIAL

## 2023-12-14 DIAGNOSIS — N39.41 URGE INCONTINENCE OF URINE: ICD-10-CM

## 2023-12-14 DIAGNOSIS — M62.89 MUSCLE HYPERTONICITY: Primary | Chronic | ICD-10-CM

## 2023-12-14 PROCEDURE — 97140 MANUAL THERAPY 1/> REGIONS: CPT | Mod: PN

## 2023-12-14 PROCEDURE — 97530 THERAPEUTIC ACTIVITIES: CPT | Mod: PN

## 2023-12-14 PROCEDURE — 97112 NEUROMUSCULAR REEDUCATION: CPT | Mod: PN
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
## 2023-12-20 ENCOUNTER — CLINICAL SUPPORT (OUTPATIENT)
Dept: REHABILITATION | Facility: HOSPITAL | Age: 28
End: 2023-12-20
Payer: COMMERCIAL

## 2023-12-20 DIAGNOSIS — N39.41 URGE INCONTINENCE OF URINE: ICD-10-CM

## 2023-12-20 DIAGNOSIS — M62.89 MUSCLE HYPERTONICITY: Primary | Chronic | ICD-10-CM

## 2023-12-20 PROCEDURE — 97112 NEUROMUSCULAR REEDUCATION: CPT | Mod: PN

## 2023-12-20 PROCEDURE — 97530 THERAPEUTIC ACTIVITIES: CPT | Mod: PN

## 2023-12-20 PROCEDURE — 97140 MANUAL THERAPY 1/> REGIONS: CPT | Mod: PN

## 2023-12-28 ENCOUNTER — CLINICAL SUPPORT (OUTPATIENT)
Dept: REHABILITATION | Facility: HOSPITAL | Age: 28
End: 2023-12-28
Payer: COMMERCIAL

## 2023-12-28 DIAGNOSIS — N39.41 URGE INCONTINENCE OF URINE: ICD-10-CM

## 2023-12-28 DIAGNOSIS — M62.89 MUSCLE HYPERTONICITY: Primary | Chronic | ICD-10-CM

## 2023-12-28 PROCEDURE — 97140 MANUAL THERAPY 1/> REGIONS: CPT | Mod: PN

## 2023-12-28 PROCEDURE — 97530 THERAPEUTIC ACTIVITIES: CPT | Mod: PN

## 2023-12-28 PROCEDURE — 97112 NEUROMUSCULAR REEDUCATION: CPT | Mod: PN
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
## 2024-01-04 ENCOUNTER — PATIENT MESSAGE (OUTPATIENT)
Dept: OBSTETRICS AND GYNECOLOGY | Facility: CLINIC | Age: 29
End: 2024-01-04
Payer: COMMERCIAL

## 2024-01-18 ENCOUNTER — LAB VISIT (OUTPATIENT)
Dept: LAB | Facility: HOSPITAL | Age: 29
End: 2024-01-18
Attending: FAMILY MEDICINE
Payer: COMMERCIAL

## 2024-01-18 ENCOUNTER — OFFICE VISIT (OUTPATIENT)
Dept: FAMILY MEDICINE | Facility: CLINIC | Age: 29
End: 2024-01-18
Payer: COMMERCIAL

## 2024-01-18 VITALS
BODY MASS INDEX: 28.46 KG/M2 | HEART RATE: 96 BPM | SYSTOLIC BLOOD PRESSURE: 116 MMHG | DIASTOLIC BLOOD PRESSURE: 68 MMHG | HEIGHT: 64 IN | WEIGHT: 166.69 LBS | OXYGEN SATURATION: 99 % | TEMPERATURE: 98 F

## 2024-01-18 DIAGNOSIS — Z13.220 ENCOUNTER FOR LIPID SCREENING FOR CARDIOVASCULAR DISEASE: ICD-10-CM

## 2024-01-18 DIAGNOSIS — J45.990 EXERCISE-INDUCED ASTHMA: ICD-10-CM

## 2024-01-18 DIAGNOSIS — M62.89 MUSCLE HYPERTONICITY: Chronic | ICD-10-CM

## 2024-01-18 DIAGNOSIS — L20.82 FLEXURAL ECZEMA: Chronic | ICD-10-CM

## 2024-01-18 DIAGNOSIS — Z00.00 ROUTINE MEDICAL EXAM: ICD-10-CM

## 2024-01-18 DIAGNOSIS — Z13.6 ENCOUNTER FOR LIPID SCREENING FOR CARDIOVASCULAR DISEASE: ICD-10-CM

## 2024-01-18 DIAGNOSIS — D50.0 IRON DEFICIENCY ANEMIA DUE TO CHRONIC BLOOD LOSS: ICD-10-CM

## 2024-01-18 DIAGNOSIS — J45.20 MILD INTERMITTENT ASTHMA WITHOUT COMPLICATION: ICD-10-CM

## 2024-01-18 DIAGNOSIS — Z00.00 ROUTINE MEDICAL EXAM: Primary | ICD-10-CM

## 2024-01-18 DIAGNOSIS — M62.89 PELVIC FLOOR DYSFUNCTION: Chronic | ICD-10-CM

## 2024-01-18 LAB
ALBUMIN SERPL BCP-MCNC: 3.9 G/DL (ref 3.5–5.2)
ALP SERPL-CCNC: 70 U/L (ref 55–135)
ALT SERPL W/O P-5'-P-CCNC: 5 U/L (ref 10–44)
ANION GAP SERPL CALC-SCNC: 8 MMOL/L (ref 8–16)
AST SERPL-CCNC: 16 U/L (ref 10–40)
BASOPHILS # BLD AUTO: 0.03 K/UL (ref 0–0.2)
BASOPHILS NFR BLD: 0.4 % (ref 0–1.9)
BILIRUB SERPL-MCNC: 0.3 MG/DL (ref 0.1–1)
BUN SERPL-MCNC: 9 MG/DL (ref 6–20)
CALCIUM SERPL-MCNC: 9.1 MG/DL (ref 8.7–10.5)
CHLORIDE SERPL-SCNC: 106 MMOL/L (ref 95–110)
CHOLEST SERPL-MCNC: 164 MG/DL (ref 120–199)
CHOLEST/HDLC SERPL: 2.5 {RATIO} (ref 2–5)
CO2 SERPL-SCNC: 28 MMOL/L (ref 23–29)
CREAT SERPL-MCNC: 0.9 MG/DL (ref 0.5–1.4)
DIFFERENTIAL METHOD BLD: ABNORMAL
EOSINOPHIL # BLD AUTO: 0.1 K/UL (ref 0–0.5)
EOSINOPHIL NFR BLD: 1.9 % (ref 0–8)
ERYTHROCYTE [DISTWIDTH] IN BLOOD BY AUTOMATED COUNT: 16.7 % (ref 11.5–14.5)
EST. GFR  (NO RACE VARIABLE): >60 ML/MIN/1.73 M^2
FERRITIN SERPL-MCNC: 20 NG/ML (ref 20–300)
GLUCOSE SERPL-MCNC: 112 MG/DL (ref 70–110)
HCT VFR BLD AUTO: 35.8 % (ref 37–48.5)
HDLC SERPL-MCNC: 66 MG/DL (ref 40–75)
HDLC SERPL: 40.2 % (ref 20–50)
HGB BLD-MCNC: 11 G/DL (ref 12–16)
IMM GRANULOCYTES # BLD AUTO: 0.02 K/UL (ref 0–0.04)
IMM GRANULOCYTES NFR BLD AUTO: 0.3 % (ref 0–0.5)
IRON SERPL-MCNC: 24 UG/DL (ref 30–160)
LDLC SERPL CALC-MCNC: 89.6 MG/DL (ref 63–159)
LYMPHOCYTES # BLD AUTO: 1.6 K/UL (ref 1–4.8)
LYMPHOCYTES NFR BLD: 22.2 % (ref 18–48)
MCH RBC QN AUTO: 23 PG (ref 27–31)
MCHC RBC AUTO-ENTMCNC: 30.7 G/DL (ref 32–36)
MCV RBC AUTO: 75 FL (ref 82–98)
MONOCYTES # BLD AUTO: 0.6 K/UL (ref 0.3–1)
MONOCYTES NFR BLD: 7.9 % (ref 4–15)
NEUTROPHILS # BLD AUTO: 5 K/UL (ref 1.8–7.7)
NEUTROPHILS NFR BLD: 67.3 % (ref 38–73)
NONHDLC SERPL-MCNC: 98 MG/DL
NRBC BLD-RTO: 0 /100 WBC
PLATELET # BLD AUTO: 285 K/UL (ref 150–450)
PMV BLD AUTO: 10.1 FL (ref 9.2–12.9)
POTASSIUM SERPL-SCNC: 3.6 MMOL/L (ref 3.5–5.1)
PROT SERPL-MCNC: 7.4 G/DL (ref 6–8.4)
RBC # BLD AUTO: 4.79 M/UL (ref 4–5.4)
SATURATED IRON: 5 % (ref 20–50)
SODIUM SERPL-SCNC: 142 MMOL/L (ref 136–145)
TOTAL IRON BINDING CAPACITY: 511 UG/DL (ref 250–450)
TRANSFERRIN SERPL-MCNC: 345 MG/DL (ref 200–375)
TRIGL SERPL-MCNC: 42 MG/DL (ref 30–150)
WBC # BLD AUTO: 7.35 K/UL (ref 3.9–12.7)

## 2024-01-18 PROCEDURE — 80053 COMPREHEN METABOLIC PANEL: CPT | Performed by: FAMILY MEDICINE

## 2024-01-18 PROCEDURE — 80061 LIPID PANEL: CPT | Performed by: FAMILY MEDICINE

## 2024-01-18 PROCEDURE — 82728 ASSAY OF FERRITIN: CPT | Performed by: FAMILY MEDICINE

## 2024-01-18 PROCEDURE — 36415 COLL VENOUS BLD VENIPUNCTURE: CPT | Mod: PN | Performed by: FAMILY MEDICINE

## 2024-01-18 PROCEDURE — 99999 PR PBB SHADOW E&M-EST. PATIENT-LVL IV: CPT | Mod: PBBFAC,,, | Performed by: FAMILY MEDICINE

## 2024-01-18 PROCEDURE — 83540 ASSAY OF IRON: CPT | Performed by: FAMILY MEDICINE

## 2024-01-18 PROCEDURE — 85025 COMPLETE CBC W/AUTO DIFF WBC: CPT | Performed by: FAMILY MEDICINE

## 2024-01-18 PROCEDURE — 3008F BODY MASS INDEX DOCD: CPT | Mod: CPTII,S$GLB,, | Performed by: FAMILY MEDICINE

## 2024-01-18 PROCEDURE — 3078F DIAST BP <80 MM HG: CPT | Mod: CPTII,S$GLB,, | Performed by: FAMILY MEDICINE

## 2024-01-18 PROCEDURE — 3074F SYST BP LT 130 MM HG: CPT | Mod: CPTII,S$GLB,, | Performed by: FAMILY MEDICINE

## 2024-01-18 PROCEDURE — 1160F RVW MEDS BY RX/DR IN RCRD: CPT | Mod: CPTII,S$GLB,, | Performed by: FAMILY MEDICINE

## 2024-01-18 PROCEDURE — 1159F MED LIST DOCD IN RCRD: CPT | Mod: CPTII,S$GLB,, | Performed by: FAMILY MEDICINE

## 2024-01-18 PROCEDURE — 99395 PREV VISIT EST AGE 18-39: CPT | Mod: S$GLB,,, | Performed by: FAMILY MEDICINE

## 2024-01-18 RX ORDER — FLUTICASONE PROPIONATE 50 MCG
2 SPRAY, SUSPENSION (ML) NASAL DAILY
Qty: 16 ML | Refills: 11 | Status: SHIPPED | OUTPATIENT
Start: 2024-01-18

## 2024-01-18 RX ORDER — ALBUTEROL SULFATE 90 UG/1
2 AEROSOL, METERED RESPIRATORY (INHALATION) EVERY 4 HOURS PRN
Qty: 18 G | Refills: 11 | Status: SHIPPED | OUTPATIENT
Start: 2024-01-18

## 2024-01-18 RX ORDER — LIDOCAINE 50 MG/G
OINTMENT TOPICAL
Qty: 50 G | Refills: 11 | Status: SHIPPED | OUTPATIENT
Start: 2024-01-18

## 2024-01-18 RX ORDER — TRIAMCINOLONE ACETONIDE 1 MG/G
OINTMENT TOPICAL 2 TIMES DAILY
Qty: 30 G | Refills: 2 | Status: SHIPPED | OUTPATIENT
Start: 2024-01-18 | End: 2024-01-23
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
## 2024-02-05 ENCOUNTER — CLINICAL SUPPORT (OUTPATIENT)
Dept: REHABILITATION | Facility: HOSPITAL | Age: 29
End: 2024-02-05
Payer: COMMERCIAL

## 2024-02-05 DIAGNOSIS — N39.41 URGE INCONTINENCE OF URINE: Primary | ICD-10-CM

## 2024-02-05 DIAGNOSIS — N94.819 VULVODYNIA: ICD-10-CM

## 2024-02-05 DIAGNOSIS — M62.89 MUSCLE HYPERTONICITY: Chronic | ICD-10-CM

## 2024-02-05 PROCEDURE — 97162 PT EVAL MOD COMPLEX 30 MIN: CPT | Mod: PN

## 2024-02-05 PROCEDURE — 97140 MANUAL THERAPY 1/> REGIONS: CPT | Mod: PN

## 2024-02-05 NOTE — PATIENT INSTRUCTIONS
"  Vaginal Dilators  Wash the vaginal dilator with warm water and soap.  Designate a place in your home that is safe and can help you get comfortable for your vaginal dilator practice preferably a place that is quiet and calming.  Use a generous amount of lubricant that is water based on the medical dilator and the canal opening. The use of a water based lubricant is important to preserve the medical grade silicone.  Select a size that looks appropriate for your condition or that a health care provider has suggested you get.  Start by lying on your back with your knees bent and feet planted. Many patients may prefer to lie on their side instead; if that is the case, be sure to have your knees bent and the top leg supported by a folded pillow between your knees.  Begin your training by taking a breath in and allowing your belly to expand, followed by exhaling and allowing your belly to slowly fall. The act of slowly exhaling can naturally open the vagina if need be. Women should repeat the deep breathing pattern and continue to do so steadily and deliberately. Gently bring the  to the vaginal opening and carefully insert it on an exhale. If you have any issues with the end of the dilator or need additional , our dilator handle can help.  Keep the vaginal dilator inserted in the vagina and repeat the slow, deep breathing cycle for 15 minutes or the length of time recommended by your healthcare provider.  If you experience discomfort, visual imagery is helpful for relaxing the pelvic floor muscles around the opening of the vagina. There are many images that may work. Pick the one that is best for you: "imagine that the vagina is like a tosin, blooming outward," "imagine that the vagina is like an umbrella and expanding,"  By practicing using vaginal dilators in different positions such as laying on your back, hands and knees, during deep squat exercises, and lying on your side, your organs shift around the " canal and the device and allow for gentle mobilization of the tissue.  Patients can also compress the dilators gently into the walls of the vagina. To do this, you need to imagine that the opening of the vagina is a clock, and gently move the dilator in a slow circular method pausing at each hour of the clock to gently press the dilator into the wall of the vagina.  Home Exercise Program: 02/05/2024    Hip and Pelvic Stretching Program                                               Hold 60 seconds, repeat 2 times.  Pull your knee close enough to your chest to keep your back flat.              \             74
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
## 2024-02-14 NOTE — PROGRESS NOTES
[facility]   Physical Therapy Treatment Note      Name: Cuong Aguayo  Clinic Number: [id number]    Therapy Diagnosis:   Encounter Diagnosis   Name Primary?    Vulvodynia Yes     Physician: Pedro Mcdonough Jr., MD    Visit Date: 2/15/2024    Physician Orders: PT Eval and Treat   Medical Diagnosis from Referral: M62.89 (ICD-10-CM) - Muscle hypertonicity   Evaluation Date: 2/5/2024  Authorization Period Expiration: 1/17/2025  Plan of Care Expiration: 5/5/2024  Progress Note Due: 3/5/2024  Visit # / Visits authorized: 1/ 1   FOTO: 1/3     Precautions: Standard     Time In: 1:45 PM   Time Out: 2:35 PM   Total Billable Time: 50 minutes      Subjective     Pt reports: slight improvement since last visit. She progressed to the 7th dilator.   She was compliant with home exercise program.  Response to previous treatment: no soreness reported   Functional change: progressed to 7th dilator     Pain: 0/10 at rest; 5/10 with internal treatment      Objective      Objective Measures updated at progress report unless specified.       Treatment   Cuong received the treatments listed below:    Pt verbally consents to intravaginal treatment today.  Signed consent form already on file.     Therapeutic Exercise to develop  strength, endurance, ROM, flexibility, posture, and core stabilization for 0 minutes including: none     Neuromuscular Re-education to develop Coordination, Control, and Down training for 10 minutes including:   Diaphragmatic breathing for down training and relaxation of pelvic floor     Manual Therapy to develop flexibility, extensibility, and desensitization for 40 minutes including:   trigger point/myofascial release of bilateral  hip flexors and adductors   Internal myofascial release of levator ani, bulbocavernosus, ischiocavernosus   External myofascial release of bulbocavernosus and ischiocavernosus     Therapeutic Activity Patient participated in dynamic functional therapeutic  activities to improve functional performance for 0 minutes. Including: Education as described below.    Home Exercises Provided and Patient Education Provided     [x] Instruction on self-release to superficial PFM to reduce discomfort with initial penetration - pt able to return demonstrate independently after initial instruction  [] Discussion of intercourse considerations for pelvic pain - lubricant, positioning, pillows, pelvic floor massage  [] Instruction on urge suppression techniques  [x] Instruction on diaphragmatic breathing and hip-opening stretches for pelvic floor relaxation  [x] Education on visual cues/mental imagery for pelvic floor relaxation  [] Education on techniques to reduce post-void dribble  [] Instruction on the Knack for reduction of stress urinary incontinence  [] Instruction on log-roll technique for transfers to reduce strain on back/abdominal wall, reduce incidence of incontinence  [] Instruction on pressure management to protect pelvic organ prolapse/prevent incontinence  [] Education on voiding optimization - seated on toilet, no pushing, pelvic floor squeeze upon completion  [] Education on bowel movement optimization - positioning, toilet stool, breath coordination, pelvic floor relaxation, abdominal wall bracing  [x] HEP building/HEP review  [x] Discussed progression of plan of care with patient    Education provided:   anatomy/physiology of pelvic floor, posture/body mechanices, dilator use, diaphragmatic breathing, and perineal stretching/massage  Discussed progression of plan of care with patient; educated pt in activity modification; reviewed HEP with pt. Pt demonstrated and verbalized understanding of all instruction and was provided with a handout of HEP (see Patient Instructions).    Written Home Exercises Provided: Patient instructed to cont prior HEP.  Exercises were reviewed and Cuong was able to demonstrate them prior to the end of the session.  Cuong demonstrated good   understanding of the education provided.     See EMR under Patient Instructions for exercises provided prior visit.      Assessment     Patient presents being complaint with HEP and progressing to 7th dilator. Focused on manual therapy for increased myofascial mobility and desensitization of tissues. Patient presents with good manual tolerance. Pelvic floor therapy remains medically necessary for patient's reproductive and gynecological health. Pt will continue to benefit from skilled outpatient physical therapy to address the deficits listed in the problem list box on initial evaluation, provide pt/family education and to maximize pt's level of independence in the home and community environment.     Cuong Is progressing well towards her goals.   Pt prognosis is Good.     Pt will continue to benefit from skilled outpatient physical therapy to address the deficits listed in the problem list box on initial evaluation, provide pt/family education and to maximize pt's level of independence in the home and community environment.     Pt's spiritual, cultural and educational needs considered and pt agreeable to plan of care and goals.     Anticipated barriers to physical therapy: none     Goals:   Short Term Goals: 4 weeks   Pt to report a decrease in pain to no more than 3/10 at it's worst with internal treatment for decreased tissue sensitivity.    Pt will report minimal to no pain with single digit pelvic assessment and display relaxation demonstrating improving pelvic floor muscle relaxation and coordination.      Long Term Goals: 12 weeks ,   Pt to be discharged with home plan for carry over after discharge.   Pt to demonstrate an improved score in the FOTO PFDI Pain survey  to at least 0 limitation to demonstrate improving ability to participate in activities of daily living with reduced pelvic floor pain.    Patient to report having penetrative intercourse with no pain for maximal quality of life.     Plan     Plan  of care Certification: 2/5/2024 to 5/5/2024.     Outpatient Physical Therapy 1-2 times weekly for 12 weeks to include the following interventions: therapeutic exercises, therapeutic activity, neuromuscular re-education, gait training, manual therapy, modalities PRN, patient/family education, and self care/home management,  and  dry needling.        Nicol Fry, PT

## 2024-02-15 ENCOUNTER — CLINICAL SUPPORT (OUTPATIENT)
Dept: REHABILITATION | Facility: HOSPITAL | Age: 29
End: 2024-02-15
Payer: COMMERCIAL

## 2024-02-15 DIAGNOSIS — N94.819 VULVODYNIA: Primary | ICD-10-CM

## 2024-02-15 PROCEDURE — 97112 NEUROMUSCULAR REEDUCATION: CPT | Mod: PN

## 2024-02-15 PROCEDURE — 97140 MANUAL THERAPY 1/> REGIONS: CPT | Mod: PN

## 2024-02-19 NOTE — PROGRESS NOTES
[facility]   Physical Therapy Treatment Note      Name: Cuong Aguayo  Clinic Number: [id number]    Therapy Diagnosis:   Encounter Diagnosis   Name Primary?    Vulvodynia Yes       Physician: Pedro Mcdonough Jr., MD    Visit Date: 2/20/2024    Physician Orders: PT Eval and Treat   Medical Diagnosis from Referral: M62.89 (ICD-10-CM) - Muscle hypertonicity   Evaluation Date: 2/5/2024  Authorization Period Expiration: 1/17/2025  Plan of Care Expiration: 5/5/2024  Progress Note Due: 3/5/2024  Visit # / Visits authorized: 1/ 1   FOTO: 1/3     Precautions: Standard     Time In: 10:32 AM   Time Out: 11:28 AM   Total Billable Time: 56 minutes      Subjective     Pt reports: still on 7th dilator. Patient reports pain 4-5/10 pain with internal treatment.   She was compliant with home exercise program.  Response to previous treatment: no soreness reported   Functional change: still on 7th dilator     Pain: 0/10 at rest; 4-5/10 with internal treatment      Objective      Objective Measures updated at progress report unless specified.       Treatment   Cuong received the treatments listed below:    Pt verbally consents to intravaginal treatment today.  Signed consent form already on file.     Therapeutic Exercise to develop  strength, endurance, ROM, flexibility, posture, and core stabilization for 0 minutes including: none     Neuromuscular Re-education to develop Coordination, Control, and Down training for 8 minutes including:   Diaphragmatic breathing for down training and relaxation of pelvic floor     Manual Therapy to develop flexibility, extensibility, and desensitization for 40 minutes including:   Internal myofascial release of levator ani, bulbocavernosus, ischiocavernosus   External myofascial release of bulbocavernosus and ischiocavernosus   + Myofascial release of bilateral piriformis and coccyx musculature     Deferred: trigger point/myofascial release of bilateral  hip flexors and  adductors     Therapeutic Activity Patient participated in dynamic functional therapeutic activities to improve functional performance for 8 minutes. Including: Education as described below.  Plan of care/progress with therapy    Home Exercises Provided and Patient Education Provided     [x] Instruction on self-release to superficial PFM to reduce discomfort with initial penetration - pt able to return demonstrate independently after initial instruction  [] Discussion of intercourse considerations for pelvic pain - lubricant, positioning, pillows, pelvic floor massage  [] Instruction on urge suppression techniques  [x] Instruction on diaphragmatic breathing and hip-opening stretches for pelvic floor relaxation  [x] Education on visual cues/mental imagery for pelvic floor relaxation  [] Education on techniques to reduce post-void dribble  [] Instruction on the Knack for reduction of stress urinary incontinence  [] Instruction on log-roll technique for transfers to reduce strain on back/abdominal wall, reduce incidence of incontinence  [] Instruction on pressure management to protect pelvic organ prolapse/prevent incontinence  [] Education on voiding optimization - seated on toilet, no pushing, pelvic floor squeeze upon completion  [] Education on bowel movement optimization - positioning, toilet stool, breath coordination, pelvic floor relaxation, abdominal wall bracing  [x] HEP building/HEP review  [x] Discussed progression of plan of care with patient    Education provided:   anatomy/physiology of pelvic floor, posture/body mechanices, dilator use, diaphragmatic breathing, and perineal stretching/massage  Discussed progression of plan of care with patient; educated pt in activity modification; reviewed HEP with pt. Pt demonstrated and verbalized understanding of all instruction and was provided with a handout of HEP (see Patient Instructions).    Written Home Exercises Provided: Patient instructed to cont prior  HEP.  Exercises were reviewed and Cuong was able to demonstrate them prior to the end of the session.  Cuong demonstrated good  understanding of the education provided.     See EMR under Patient Instructions for exercises provided prior visit.      Assessment     Patient presents being complaint with HEP and using 7th dilator. Focused on manual therapy for increased myofascial mobility and desensitization of tissues. Patient presents with increased sensitivity to the lateral borders of the anal triangle. External release of piriformis and coccyx musculature was performed for increased posterior levator ani extensibility. Patient presents with good manual tolerance and 4-5/10 pain with internal treatment. Pelvic floor therapy remains medically necessary for patient's reproductive and gynecological health. Pt will continue to benefit from skilled outpatient physical therapy to address the deficits listed in the problem list box on initial evaluation, provide pt/family education and to maximize pt's level of independence in the home and community environment.     Cuong Is progressing well towards her goals.   Pt prognosis is Good.     Pt will continue to benefit from skilled outpatient physical therapy to address the deficits listed in the problem list box on initial evaluation, provide pt/family education and to maximize pt's level of independence in the home and community environment.     Pt's spiritual, cultural and educational needs considered and pt agreeable to plan of care and goals.     Anticipated barriers to physical therapy: none     Goals:   Short Term Goals: 4 weeks   Pt to report a decrease in pain to no more than 3/10 at it's worst with internal treatment for decreased tissue sensitivity.    Pt will report minimal to no pain with single digit pelvic assessment and display relaxation demonstrating improving pelvic floor muscle relaxation and coordination.      Long Term Goals: 12 weeks ,   Pt to be  discharged with home plan for carry over after discharge.   Pt to demonstrate an improved score in the FOTO PFDI Pain survey  to at least 0 limitation to demonstrate improving ability to participate in activities of daily living with reduced pelvic floor pain.    Patient to report having penetrative intercourse with no pain for maximal quality of life.     Plan     Plan of care Certification: 2/5/2024 to 5/5/2024.     Outpatient Physical Therapy 1-2 times weekly for 12 weeks to include the following interventions: therapeutic exercises, therapeutic activity, neuromuscular re-education, gait training, manual therapy, modalities PRN, patient/family education, and self care/home management,  and  dry needling.        Nicol Fry, PT

## 2024-02-20 ENCOUNTER — CLINICAL SUPPORT (OUTPATIENT)
Dept: REHABILITATION | Facility: HOSPITAL | Age: 29
End: 2024-02-20
Payer: COMMERCIAL

## 2024-02-20 DIAGNOSIS — N94.819 VULVODYNIA: Primary | ICD-10-CM

## 2024-02-20 PROCEDURE — 97530 THERAPEUTIC ACTIVITIES: CPT | Mod: PN

## 2024-02-20 PROCEDURE — 97112 NEUROMUSCULAR REEDUCATION: CPT | Mod: PN

## 2024-02-20 PROCEDURE — 97140 MANUAL THERAPY 1/> REGIONS: CPT | Mod: PN

## 2024-03-08 ENCOUNTER — CLINICAL SUPPORT (OUTPATIENT)
Dept: REHABILITATION | Facility: HOSPITAL | Age: 29
End: 2024-03-08
Payer: COMMERCIAL

## 2024-03-08 DIAGNOSIS — N94.819 VULVODYNIA: Primary | ICD-10-CM

## 2024-03-08 PROCEDURE — 97110 THERAPEUTIC EXERCISES: CPT | Mod: PN

## 2024-03-08 PROCEDURE — 97530 THERAPEUTIC ACTIVITIES: CPT | Mod: PN

## 2024-03-08 PROCEDURE — 97140 MANUAL THERAPY 1/> REGIONS: CPT | Mod: PN

## 2024-03-08 NOTE — PROGRESS NOTES
[facility]   Physical Therapy Treatment Note      Name: Cuong Aguayo  Clinic Number: [id number]    Therapy Diagnosis:   Encounter Diagnosis   Name Primary?    Vulvodynia Yes     Physician: Pedro Mcdonough Jr., MD    Visit Date: 3/8/2024    Physician Orders: PT Eval and Treat   Medical Diagnosis from Referral: M62.89 (ICD-10-CM) - Muscle hypertonicity   Evaluation Date: 2/5/2024  Authorization Period Expiration: 1/17/2025  Plan of Care Expiration: 5/5/2024  Progress Note Due: 3/5/2024  Visit # / Visits authorized: 3/ 6   FOTO: 1/3     Precautions: Standard     Time In: 10:00 AM   Time Out: 10:45 AM   Total Billable Time: 45 minutes      Subjective     Pt reports: no changes since last week. Patient reports being on her cycle and wanting to keep things external today.   She was compliant with home exercise program.  Response to previous treatment: no soreness reported   Functional change: still on 7th dilator     Pain: 8/10 at rest- period cramps       Objective      Objective Measures updated at progress report unless specified.       Treatment   Cuong received the treatments listed below:    Pt verbally consents to intravaginal treatment today.  Signed consent form already on file.     Therapeutic Exercise to develop  strength, endurance, ROM, flexibility, posture, and core stabilization for 8 minutes including: none   Happy baby 2 x 1 min  Butterfly stretch x 1 min   Eusebio stretch x 1 min each     Neuromuscular Re-education to develop Coordination, Control, and Down training for 0 minutes including:   Deferred:   Diaphragmatic breathing for down training and relaxation of pelvic floor     Manual Therapy to develop flexibility, extensibility, and desensitization for 29 minutes including:   trigger point/myofascial release of bilateral  hip flexors and adductors     Deferred:   Internal myofascial release of levator ani, bulbocavernosus, ischiocavernosus   External myofascial release of  bulbocavernosus and ischiocavernosus   + Myofascial release of bilateral piriformis and coccyx musculature     Therapeutic Activity Patient participated in dynamic functional therapeutic activities to improve functional performance for 8 minutes. Including: Education as described below.  Plan of care/progress with therapy    Home Exercises Provided and Patient Education Provided     [x] Instruction on self-release to superficial PFM to reduce discomfort with initial penetration - pt able to return demonstrate independently after initial instruction  [] Discussion of intercourse considerations for pelvic pain - lubricant, positioning, pillows, pelvic floor massage  [] Instruction on urge suppression techniques  [x] Instruction on diaphragmatic breathing and hip-opening stretches for pelvic floor relaxation  [x] Education on visual cues/mental imagery for pelvic floor relaxation  [] Education on techniques to reduce post-void dribble  [] Instruction on the Knack for reduction of stress urinary incontinence  [] Instruction on log-roll technique for transfers to reduce strain on back/abdominal wall, reduce incidence of incontinence  [] Instruction on pressure management to protect pelvic organ prolapse/prevent incontinence  [] Education on voiding optimization - seated on toilet, no pushing, pelvic floor squeeze upon completion  [] Education on bowel movement optimization - positioning, toilet stool, breath coordination, pelvic floor relaxation, abdominal wall bracing  [x] HEP building/HEP review  [x] Discussed progression of plan of care with patient    Education provided:   anatomy/physiology of pelvic floor, posture/body mechanices, dilator use, diaphragmatic breathing, and perineal stretching/massage  Discussed progression of plan of care with patient; educated pt in activity modification; reviewed HEP with pt. Pt demonstrated and verbalized understanding of all instruction and was provided with a handout of HEP  (see Patient Instructions).    Written Home Exercises Provided: Patient instructed to cont prior HEP.  Exercises were reviewed and Cuong was able to demonstrate them prior to the end of the session.  Cuong demonstrated good  understanding of the education provided.     See EMR under Patient Instructions for exercises provided prior visit.      Assessment     Patient presents being complaint with HEP and using 7th dilator. Patient presents on her cycle asking to keep treatment external. Focused on manual therapy for increased myofascial mobility and desensitization of tissues. Patient presents with good manual tolerance. Manual therapy was followed with external stretches for improved muscle relaxation and extensibility. Pelvic floor therapy remains medically necessary for patient's reproductive and gynecological health. Pt will continue to benefit from skilled outpatient physical therapy to address the deficits listed in the problem list box on initial evaluation, provide pt/family education and to maximize pt's level of independence in the home and community environment.     Cuong Is progressing well towards her goals.   Pt prognosis is Good.     Pt will continue to benefit from skilled outpatient physical therapy to address the deficits listed in the problem list box on initial evaluation, provide pt/family education and to maximize pt's level of independence in the home and community environment.     Pt's spiritual, cultural and educational needs considered and pt agreeable to plan of care and goals.     Anticipated barriers to physical therapy: none     Goals:   Short Term Goals: 4 weeks   Pt to report a decrease in pain to no more than 3/10 at it's worst with internal treatment for decreased tissue sensitivity.    Pt will report minimal to no pain with single digit pelvic assessment and display relaxation demonstrating improving pelvic floor muscle relaxation and coordination.      Long Term Goals: 12 weeks  ,   Pt to be discharged with home plan for carry over after discharge.   Pt to demonstrate an improved score in the FOTO PFDI Pain survey  to at least 0 limitation to demonstrate improving ability to participate in activities of daily living with reduced pelvic floor pain.    Patient to report having penetrative intercourse with no pain for maximal quality of life.     Plan     Plan of care Certification: 2/5/2024 to 5/5/2024.     Outpatient Physical Therapy 1-2 times weekly for 12 weeks to include the following interventions: therapeutic exercises, therapeutic activity, neuromuscular re-education, gait training, manual therapy, modalities PRN, patient/family education, and self care/home management,  and  dry needling.        Nicol Fry, PT

## 2024-03-14 ENCOUNTER — CLINICAL SUPPORT (OUTPATIENT)
Dept: REHABILITATION | Facility: HOSPITAL | Age: 29
End: 2024-03-14
Payer: COMMERCIAL

## 2024-03-14 DIAGNOSIS — N94.819 VULVODYNIA: Primary | ICD-10-CM

## 2024-03-14 PROCEDURE — 97140 MANUAL THERAPY 1/> REGIONS: CPT | Mod: PN

## 2024-03-14 PROCEDURE — 97530 THERAPEUTIC ACTIVITIES: CPT | Mod: PN

## 2024-03-14 PROCEDURE — 97112 NEUROMUSCULAR REEDUCATION: CPT | Mod: PN

## 2024-03-14 NOTE — PROGRESS NOTES
[facility]   Physical Therapy Treatment Note      Name: Cuong Aguayo  Clinic Number: [id number]    Therapy Diagnosis:   Encounter Diagnosis   Name Primary?    Vulvodynia Yes       Physician: Pedro Mcdonough Jr., MD    Visit Date: 3/14/2024    Physician Orders: PT Eval and Treat   Medical Diagnosis from Referral: M62.89 (ICD-10-CM) - Muscle hypertonicity   Evaluation Date: 2/5/2024  Authorization Period Expiration: 1/17/2025  Plan of Care Expiration: 5/5/2024  Progress Note Due: 4/14/2024  Visit # / Visits authorized: 4/ 6   FOTO: 1/3     Precautions: Standard     Time In: 10:30 AM   Time Out: 11:20 AM   Total Billable Time: 50 minutes      Subjective     Pt reports: still on 7th dilator. No changes since last visit.   She was compliant with home exercise program.  Response to previous treatment: no soreness reported   Functional change: still on 7th dilator     Pain: 0/10 at rest      Objective      Objective Measures updated at progress report unless specified.     VAGINAL PELVIC FLOOR EXAM     EXTERNAL ASSESSMENT  Introitus: WNL  Skin condition: WNL  Scarring: did not formally assess today    Sensation: WNL   Pain: no pain reported during external treatment                            INTERNAL ASSESSMENT  Pain: trigger points as follows: bulbocavernosus and levator ani; max pain of 5/10    Sensation: able to localized pressure appropriately   Vaginal vault: stenotic   Muscle Bulk: decreased hypertonicity noted   Comment: improved pelvic floor relaxation during treatment; increased ease of insertion  Treatment   Cuong received the treatments listed below:    Pt verbally consents to intravaginal treatment today.  Signed consent form already on file.     Therapeutic Exercise to develop  strength, endurance, ROM, flexibility, posture, and core stabilization for 0 minutes including: none     Deferred: Happy baby 2 x 1 min  Butterfly stretch x 1 min   Eusebio stretch x 1 min each      Neuromuscular Re-education to develop Coordination, Control, and Down training for 8 minutes including:   Deferred:   Diaphragmatic breathing for down training and relaxation of pelvic floor     Manual Therapy to develop flexibility, extensibility, and desensitization for 34 minutes including:   trigger point/myofascial release of bilateral  hip flexors and adductors   Internal myofascial release of levator ani, bulbocavernosus, ischiocavernosus   External myofascial release of bulbocavernosus and ischiocavernosus     Deferred:   + Myofascial release of bilateral piriformis and coccyx musculature     Therapeutic Activity Patient participated in dynamic functional therapeutic activities to improve functional performance for 8 minutes. Including: Education as described below.  Plan of care/progress with therapy  Dilator use   Intimacy positions    Home Exercises Provided and Patient Education Provided     [x] Instruction on self-release to superficial PFM to reduce discomfort with initial penetration - pt able to return demonstrate independently after initial instruction  [] Discussion of intercourse considerations for pelvic pain - lubricant, positioning, pillows, pelvic floor massage  [] Instruction on urge suppression techniques  [x] Instruction on diaphragmatic breathing and hip-opening stretches for pelvic floor relaxation  [x] Education on visual cues/mental imagery for pelvic floor relaxation  [] Education on techniques to reduce post-void dribble  [] Instruction on the Knack for reduction of stress urinary incontinence  [] Instruction on log-roll technique for transfers to reduce strain on back/abdominal wall, reduce incidence of incontinence  [] Instruction on pressure management to protect pelvic organ prolapse/prevent incontinence  [] Education on voiding optimization - seated on toilet, no pushing, pelvic floor squeeze upon completion  [] Education on bowel movement optimization - positioning, toilet  stool, breath coordination, pelvic floor relaxation, abdominal wall bracing  [x] HEP building/HEP review  [x] Discussed progression of plan of care with patient    Education provided:   anatomy/physiology of pelvic floor, posture/body mechanices, dilator use, diaphragmatic breathing, and perineal stretching/massage  Discussed progression of plan of care with patient; educated pt in activity modification; reviewed HEP with pt. Pt demonstrated and verbalized understanding of all instruction and was provided with a handout of HEP (see Patient Instructions).    Written Home Exercises Provided: Patient instructed to cont prior HEP.  Exercises were reviewed and Cuong was able to demonstrate them prior to the end of the session.  Cuong demonstrated good  understanding of the education provided.     See EMR under Patient Instructions for exercises provided prior visit.      Assessment     Patient presents being complaint with HEP and using 7th dilator. Focused on manual therapy for increased myofascial mobility and desensitization of tissues. Patient presents with good manual tolerance and improved pelvic floor relaxation during treatment. Also increased relaxation with insertion. Patient is progressing well with therapy and pelvic floor therapy remains medically necessary for patient's reproductive and gynecological health. Pt will continue to benefit from skilled outpatient physical therapy to address the deficits listed in the problem list box on initial evaluation, provide pt/family education and to maximize pt's level of independence in the home and community environment.     Cuong Is progressing well towards her goals.   Pt prognosis is Good.     Pt will continue to benefit from skilled outpatient physical therapy to address the deficits listed in the problem list box on initial evaluation, provide pt/family education and to maximize pt's level of independence in the home and community environment.     Pt's  spiritual, cultural and educational needs considered and pt agreeable to plan of care and goals.     Anticipated barriers to physical therapy: none     Goals:   Short Term Goals: 4 weeks   Pt to report a decrease in pain to no more than 3/10 at it's worst with internal treatment for decreased tissue sensitivity.  progressing  Pt will report minimal to no pain with single digit pelvic assessment and display relaxation demonstrating improving pelvic floor muscle relaxation and coordination. progressing     Long Term Goals: 12 weeks ,   Pt to be discharged with home plan for carry over after discharge. progressing  Pt to demonstrate an improved score in the FOTO PFDI Pain survey  to at least 0 limitation to demonstrate improving ability to participate in activities of daily living with reduced pelvic floor pain.  progressing  Patient to report having penetrative intercourse with no pain for maximal quality of life. progressing    Plan     Plan of care Certification: 2/5/2024 to 5/5/2024.     Outpatient Physical Therapy 1-2 times weekly for 12 weeks to include the following interventions: therapeutic exercises, therapeutic activity, neuromuscular re-education, gait training, manual therapy, modalities PRN, patient/family education, and self care/home management,  and  dry needling.        Nicol Fry, PT

## 2024-03-19 NOTE — PROGRESS NOTES
[facility]   Physical Therapy Treatment Note      Name: Cuong Aguayo  Clinic Number: [id number]    Therapy Diagnosis:   Encounter Diagnosis   Name Primary?    Vulvodynia Yes       Physician: Pedro Mcdonough Jr., MD    Visit Date: 3/21/2024    Physician Orders: PT Eval and Treat   Medical Diagnosis from Referral: M62.89 (ICD-10-CM) - Muscle hypertonicity   Evaluation Date: 2/5/2024  Authorization Period Expiration: 1/17/2025  Plan of Care Expiration: 5/5/2024  Progress Note Due: 4/14/2024  Visit # / Visits authorized: 4/ 6   FOTO: 1/3     Precautions: Standard     Time In: 2:30 PM   Time Out: 3:20 PM   Total Billable Time: 50 minutes      Subjective     Pt reports: still on 7th dilator. No changes since last visit.   She was compliant with home exercise program.  Response to previous treatment: no soreness reported   Functional change: still on 7th dilator     Pain: 0/10 at rest      Objective      Objective Measures updated at progress report unless specified.     VAGINAL PELVIC FLOOR EXAM     EXTERNAL ASSESSMENT  Introitus: WNL  Skin condition: WNL  Scarring: did not formally assess today    Sensation: WNL   Pain: no pain reported during external treatment                            INTERNAL ASSESSMENT  Pain: trigger points as follows: bulbocavernosus and levator ani; max pain of 5/10    Sensation: able to localized pressure appropriately   Vaginal vault: stenotic   Muscle Bulk: decreased hypertonicity noted   Comment: improved pelvic floor relaxation during treatment; increased ease of insertion  Treatment   Cuong received the treatments listed below:    Pt verbally consents to intravaginal treatment today.  Signed consent form already on file.     Therapeutic Exercise to develop  strength, endurance, ROM, flexibility, posture, and core stabilization for 0 minutes including: none     Deferred: Happy baby 2 x 1 min  Butterfly stretch x 1 min   Eusebio stretch x 1 min each      Neuromuscular Re-education to develop Coordination, Control, and Down training for 8 minutes including:   Diaphragmatic breathing for down training and relaxation of pelvic floor     Manual Therapy to develop flexibility, extensibility, and desensitization for 34 minutes including:   trigger point/myofascial release of bilateral  hip flexors and adductors   Internal myofascial release of levator ani, bulbocavernosus, ischiocavernosus   External myofascial release of bulbocavernosus and ischiocavernosus     Deferred:   + Myofascial release of bilateral piriformis and coccyx musculature     Therapeutic Activity Patient participated in dynamic functional therapeutic activities to improve functional performance for 8 minutes. Including: Education as described below.  Plan of care/progress with therapy    Home Exercises Provided and Patient Education Provided     [x] Instruction on self-release to superficial PFM to reduce discomfort with initial penetration - pt able to return demonstrate independently after initial instruction  [] Discussion of intercourse considerations for pelvic pain - lubricant, positioning, pillows, pelvic floor massage  [] Instruction on urge suppression techniques  [x] Instruction on diaphragmatic breathing and hip-opening stretches for pelvic floor relaxation  [x] Education on visual cues/mental imagery for pelvic floor relaxation  [] Education on techniques to reduce post-void dribble  [] Instruction on the Knack for reduction of stress urinary incontinence  [] Instruction on log-roll technique for transfers to reduce strain on back/abdominal wall, reduce incidence of incontinence  [] Instruction on pressure management to protect pelvic organ prolapse/prevent incontinence  [] Education on voiding optimization - seated on toilet, no pushing, pelvic floor squeeze upon completion  [] Education on bowel movement optimization - positioning, toilet stool, breath coordination, pelvic floor  relaxation, abdominal wall bracing  [x] HEP building/HEP review  [x] Discussed progression of plan of care with patient    Education provided:   anatomy/physiology of pelvic floor, posture/body mechanices, dilator use, diaphragmatic breathing, and perineal stretching/massage  Discussed progression of plan of care with patient; educated pt in activity modification; reviewed HEP with pt. Pt demonstrated and verbalized understanding of all instruction and was provided with a handout of HEP (see Patient Instructions).    Written Home Exercises Provided: Patient instructed to cont prior HEP.  Exercises were reviewed and Cuong was able to demonstrate them prior to the end of the session.  Cuong demonstrated good  understanding of the education provided.     See EMR under Patient Instructions for exercises provided prior visit.      Assessment     Patient presents being complaint with HEP. Focused on manual therapy for increased myofascial mobility and desensitization of tissues. Patient presents with good manual tolerance and pelvic floor relaxation during treatment. Patient is progressing well with therapy and pelvic floor therapy remains medically necessary for patient's reproductive and gynecological health. Pt will continue to benefit from skilled outpatient physical therapy to address the deficits listed in the problem list box on initial evaluation, provide pt/family education and to maximize pt's level of independence in the home and community environment.     Cuong Is progressing well towards her goals.   Pt prognosis is Good.     Pt will continue to benefit from skilled outpatient physical therapy to address the deficits listed in the problem list box on initial evaluation, provide pt/family education and to maximize pt's level of independence in the home and community environment.     Pt's spiritual, cultural and educational needs considered and pt agreeable to plan of care and goals.     Anticipated barriers  to physical therapy: none     Goals:   Short Term Goals: 4 weeks   Pt to report a decrease in pain to no more than 3/10 at it's worst with internal treatment for decreased tissue sensitivity.  progressing  Pt will report minimal to no pain with single digit pelvic assessment and display relaxation demonstrating improving pelvic floor muscle relaxation and coordination. progressing     Long Term Goals: 12 weeks ,   Pt to be discharged with home plan for carry over after discharge. progressing  Pt to demonstrate an improved score in the FOTO PFDI Pain survey  to at least 0 limitation to demonstrate improving ability to participate in activities of daily living with reduced pelvic floor pain.  progressing  Patient to report having penetrative intercourse with no pain for maximal quality of life. progressing    Plan     Plan of care Certification: 2/5/2024 to 5/5/2024.     Outpatient Physical Therapy 1-2 times weekly for 12 weeks to include the following interventions: therapeutic exercises, therapeutic activity, neuromuscular re-education, gait training, manual therapy, modalities PRN, patient/family education, and self care/home management,  and  dry needling.        Nicol Fry, PT

## 2024-03-20 ENCOUNTER — OFFICE VISIT (OUTPATIENT)
Dept: OBSTETRICS AND GYNECOLOGY | Facility: CLINIC | Age: 29
End: 2024-03-20
Payer: COMMERCIAL

## 2024-03-20 VITALS
DIASTOLIC BLOOD PRESSURE: 80 MMHG | SYSTOLIC BLOOD PRESSURE: 128 MMHG | WEIGHT: 167.13 LBS | BODY MASS INDEX: 28.68 KG/M2

## 2024-03-20 DIAGNOSIS — Z01.419 WELL WOMAN EXAM WITH ROUTINE GYNECOLOGICAL EXAM: Primary | ICD-10-CM

## 2024-03-20 PROCEDURE — 99999 PR PBB SHADOW E&M-EST. PATIENT-LVL III: CPT | Mod: PBBFAC,,, | Performed by: OBSTETRICS & GYNECOLOGY

## 2024-03-20 PROCEDURE — 99395 PREV VISIT EST AGE 18-39: CPT | Mod: S$GLB,,, | Performed by: OBSTETRICS & GYNECOLOGY

## 2024-03-20 PROCEDURE — 3074F SYST BP LT 130 MM HG: CPT | Mod: CPTII,S$GLB,, | Performed by: OBSTETRICS & GYNECOLOGY

## 2024-03-20 PROCEDURE — 3008F BODY MASS INDEX DOCD: CPT | Mod: CPTII,S$GLB,, | Performed by: OBSTETRICS & GYNECOLOGY

## 2024-03-20 PROCEDURE — 1159F MED LIST DOCD IN RCRD: CPT | Mod: CPTII,S$GLB,, | Performed by: OBSTETRICS & GYNECOLOGY

## 2024-03-20 PROCEDURE — 3079F DIAST BP 80-89 MM HG: CPT | Mod: CPTII,S$GLB,, | Performed by: OBSTETRICS & GYNECOLOGY
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
## 2024-03-21 ENCOUNTER — CLINICAL SUPPORT (OUTPATIENT)
Dept: REHABILITATION | Facility: HOSPITAL | Age: 29
End: 2024-03-21
Payer: COMMERCIAL

## 2024-03-21 DIAGNOSIS — N94.819 VULVODYNIA: Primary | ICD-10-CM

## 2024-03-21 PROCEDURE — 97140 MANUAL THERAPY 1/> REGIONS: CPT | Mod: PN

## 2024-03-21 PROCEDURE — 97112 NEUROMUSCULAR REEDUCATION: CPT | Mod: PN

## 2024-03-21 PROCEDURE — 97530 THERAPEUTIC ACTIVITIES: CPT | Mod: PN

## 2024-03-25 ENCOUNTER — CLINICAL SUPPORT (OUTPATIENT)
Dept: REHABILITATION | Facility: HOSPITAL | Age: 29
End: 2024-03-25
Payer: COMMERCIAL

## 2024-03-25 DIAGNOSIS — N94.819 VULVODYNIA: Primary | ICD-10-CM

## 2024-03-25 PROCEDURE — 97530 THERAPEUTIC ACTIVITIES: CPT | Mod: PN

## 2024-03-25 PROCEDURE — 97112 NEUROMUSCULAR REEDUCATION: CPT | Mod: PN

## 2024-03-25 PROCEDURE — 97140 MANUAL THERAPY 1/> REGIONS: CPT | Mod: PN

## 2024-03-25 NOTE — PROGRESS NOTES
[facility]   Physical Therapy Treatment Note      Name: Cuong Aguayo  Clinic Number: [id number]    Therapy Diagnosis:   Encounter Diagnosis   Name Primary?    Vulvodynia Yes       Physician: Pedro Mcdonough Jr., MD    Visit Date: 3/25/2024    Physician Orders: PT Eval and Treat   Medical Diagnosis from Referral: M62.89 (ICD-10-CM) - Muscle hypertonicity   Evaluation Date: 2/5/2024  Authorization Period Expiration: 1/17/2025  Plan of Care Expiration: 5/5/2024  Progress Note Due: 4/14/2024  Visit # / Visits authorized: 6/ 20   FOTO: 1/3     Precautions: Standard     Time In: 2:30 PM   Time Out: 3:15 PM   Total Billable Time: 45 minutes      Subjective     Pt reports: no changes since last visit. Patient did not use her muscle relaxer/pain relief cream.   She was compliant with home exercise program.  Response to previous treatment: a little sore, but had pelvic exam the day before   Functional change: still on 7th dilator     Pain: 0/10 at rest      Objective      Objective Measures updated at progress report unless specified.     VAGINAL PELVIC FLOOR EXAM     EXTERNAL ASSESSMENT  Introitus: WNL  Skin condition: WNL  Scarring: did not formally assess today    Sensation: WNL   Pain: no pain reported during external treatment                            INTERNAL ASSESSMENT  Pain: trigger points as follows: bulbocavernosus and levator ani; max pain of 5/10    Sensation: able to localized pressure appropriately   Vaginal vault: stenotic   Muscle Bulk: decreased hypertonicity noted   Comment: improved pelvic floor relaxation during treatment; increased ease of insertion  Treatment   Cuong received the treatments listed below:    Pt verbally consents to intravaginal treatment today.  Signed consent form already on file.     Therapeutic Exercise to develop  strength, endurance, ROM, flexibility, posture, and core stabilization for 0 minutes including: none     Deferred: Happy baby 2 x 1  min  Butterfly stretch x 1 min   Eusebio stretch x 1 min each     Neuromuscular Re-education to develop Coordination, Control, and Down training for 8 minutes including:   Diaphragmatic breathing for down training and relaxation of pelvic floor     Manual Therapy to develop flexibility, extensibility, and desensitization for 29 minutes including:   trigger point/myofascial release of bilateral  hip flexors and adductors   Internal myofascial release of levator ani, bulbocavernosus, ischiocavernosus   External myofascial release of bulbocavernosus and ischiocavernosus     Deferred:   + Myofascial release of bilateral piriformis and coccyx musculature     Therapeutic Activity Patient participated in dynamic functional therapeutic activities to improve functional performance for 8 minutes. Including: Education as described below.  Plan of care/progress with therapy    Home Exercises Provided and Patient Education Provided     [x] Instruction on self-release to superficial PFM to reduce discomfort with initial penetration - pt able to return demonstrate independently after initial instruction  [] Discussion of intercourse considerations for pelvic pain - lubricant, positioning, pillows, pelvic floor massage  [] Instruction on urge suppression techniques  [x] Instruction on diaphragmatic breathing and hip-opening stretches for pelvic floor relaxation  [x] Education on visual cues/mental imagery for pelvic floor relaxation  [] Education on techniques to reduce post-void dribble  [] Instruction on the Knack for reduction of stress urinary incontinence  [] Instruction on log-roll technique for transfers to reduce strain on back/abdominal wall, reduce incidence of incontinence  [] Instruction on pressure management to protect pelvic organ prolapse/prevent incontinence  [] Education on voiding optimization - seated on toilet, no pushing, pelvic floor squeeze upon completion  [] Education on bowel movement optimization -  positioning, toilet stool, breath coordination, pelvic floor relaxation, abdominal wall bracing  [x] HEP building/HEP review  [x] Discussed progression of plan of care with patient    Education provided:   anatomy/physiology of pelvic floor, posture/body mechanices, dilator use, diaphragmatic breathing, and perineal stretching/massage  Discussed progression of plan of care with patient; educated pt in activity modification; reviewed HEP with pt. Pt demonstrated and verbalized understanding of all instruction and was provided with a handout of HEP (see Patient Instructions).    Written Home Exercises Provided: Patient instructed to cont prior HEP.  Exercises were reviewed and Cuong was able to demonstrate them prior to the end of the session.  Cuong demonstrated good  understanding of the education provided.     See EMR under Patient Instructions for exercises provided prior visit.      Assessment     Patient presents being complaint with HEP. Focused on manual therapy for increased myofascial mobility and desensitization of tissues. Patient presents with good manual tolerance and pelvic floor relaxation during treatment. Patient did not use her muscle relaxer/pain relief cream today and tolerated session well. Patient is progressing well with therapy and pelvic floor therapy remains medically necessary for patient's reproductive and gynecological health. Pt will continue to benefit from skilled outpatient physical therapy to address the deficits listed in the problem list box on initial evaluation, provide pt/family education and to maximize pt's level of independence in the home and community environment.     Cuong Is progressing well towards her goals.   Pt prognosis is Good.     Pt will continue to benefit from skilled outpatient physical therapy to address the deficits listed in the problem list box on initial evaluation, provide pt/family education and to maximize pt's level of independence in the home and  community environment.     Pt's spiritual, cultural and educational needs considered and pt agreeable to plan of care and goals.     Anticipated barriers to physical therapy: none     Goals:   Short Term Goals: 4 weeks   Pt to report a decrease in pain to no more than 3/10 at it's worst with internal treatment for decreased tissue sensitivity.  progressing  Pt will report minimal to no pain with single digit pelvic assessment and display relaxation demonstrating improving pelvic floor muscle relaxation and coordination. progressing     Long Term Goals: 12 weeks ,   Pt to be discharged with home plan for carry over after discharge. progressing  Pt to demonstrate an improved score in the FOTO PFDI Pain survey  to at least 0 limitation to demonstrate improving ability to participate in activities of daily living with reduced pelvic floor pain.  progressing  Patient to report having penetrative intercourse with no pain for maximal quality of life. progressing    Plan     Plan of care Certification: 2/5/2024 to 5/5/2024.     Outpatient Physical Therapy 1-2 times weekly for 12 weeks to include the following interventions: therapeutic exercises, therapeutic activity, neuromuscular re-education, gait training, manual therapy, modalities PRN, patient/family education, and self care/home management,  and  dry needling.        Nicol Fry, PT

## 2024-03-28 NOTE — PROGRESS NOTES
[facility]   Physical Therapy Treatment Note      Name: Cuong Aguayo  Clinic Number: [id number]    Therapy Diagnosis:   Encounter Diagnosis   Name Primary?    Vulvodynia Yes     Physician: Pedro Mcdonough Jr., MD    Visit Date: 4/1/2024    Physician Orders: PT Eval and Treat   Medical Diagnosis from Referral: M62.89 (ICD-10-CM) - Muscle hypertonicity   Evaluation Date: 2/5/2024  Authorization Period Expiration: 1/17/2025  Plan of Care Expiration: 5/5/2024  Progress Note Due: 4/14/2024  Visit # / Visits authorized: 6/ 20   FOTO: 1/3     Precautions: Standard     Time In: 11:00 AM    Time Out: 11:45 AM   Total Billable Time: 45 minutes      Subjective     Pt reports: no changes since last visit.   She was compliant with home exercise program.  Response to previous treatment: a little sore, but had pelvic exam the day before   Functional change: still on 7th dilator     Pain: 0/10 at rest      Objective      Objective Measures updated at progress report unless specified.     VAGINAL PELVIC FLOOR EXAM     EXTERNAL ASSESSMENT  Introitus: WNL  Skin condition: WNL  Scarring: did not formally assess today    Sensation: WNL   Pain: no pain reported during external treatment                            INTERNAL ASSESSMENT  Pain: trigger points as follows: bulbocavernosus and levator ani; max pain of 5/10    Sensation: able to localized pressure appropriately   Vaginal vault: stenotic   Muscle Bulk: decreased hypertonicity noted   Comment: improved pelvic floor relaxation during treatment; increased ease of insertion  Treatment   Cuong received the treatments listed below:    Pt verbally consents to intravaginal treatment today.  Signed consent form already on file.     Therapeutic Exercise to develop  strength, endurance, ROM, flexibility, posture, and core stabilization for 0 minutes including: none     Deferred: Happy baby 2 x 1 min  Butterfly stretch x 1 min   Eusebio stretch x 1 min each      Neuromuscular Re-education to develop Coordination, Control, and Down training for 8 minutes including:   Diaphragmatic breathing for down training and relaxation of pelvic floor     Manual Therapy to develop flexibility, extensibility, and desensitization for 29 minutes including:   Internal myofascial release of levator ani, bulbocavernosus, ischiocavernosus   External myofascial release of bulbocavernosus and ischiocavernosus     Deferred:   Myofascial release of bilateral piriformis and coccyx musculature   trigger point/myofascial release of bilateral  hip flexors and adductors     Therapeutic Activity Patient participated in dynamic functional therapeutic activities to improve functional performance for 8 minutes. Including: Education as described below.  Plan of care/progress with therapy  HEP     Home Exercises Provided and Patient Education Provided     [x] Instruction on self-release to superficial PFM to reduce discomfort with initial penetration - pt able to return demonstrate independently after initial instruction  [] Discussion of intercourse considerations for pelvic pain - lubricant, positioning, pillows, pelvic floor massage  [] Instruction on urge suppression techniques  [x] Instruction on diaphragmatic breathing and hip-opening stretches for pelvic floor relaxation  [x] Education on visual cues/mental imagery for pelvic floor relaxation  [] Education on techniques to reduce post-void dribble  [] Instruction on the Knack for reduction of stress urinary incontinence  [] Instruction on log-roll technique for transfers to reduce strain on back/abdominal wall, reduce incidence of incontinence  [] Instruction on pressure management to protect pelvic organ prolapse/prevent incontinence  [] Education on voiding optimization - seated on toilet, no pushing, pelvic floor squeeze upon completion  [] Education on bowel movement optimization - positioning, toilet stool, breath coordination, pelvic floor  relaxation, abdominal wall bracing  [x] HEP building/HEP review  [x] Discussed progression of plan of care with patient    Education provided:   anatomy/physiology of pelvic floor, posture/body mechanices, dilator use, diaphragmatic breathing, and perineal stretching/massage  Discussed progression of plan of care with patient; educated pt in activity modification; reviewed HEP with pt. Pt demonstrated and verbalized understanding of all instruction and was provided with a handout of HEP (see Patient Instructions).    Written Home Exercises Provided: Patient instructed to cont prior HEP.  Exercises were reviewed and Cuong was able to demonstrate them prior to the end of the session.  Cuong demonstrated good  understanding of the education provided.     See EMR under Patient Instructions for exercises provided prior visit.      Assessment     Patient presents being complaint with HEP. Focused on manual therapy for increased myofascial mobility and desensitization of tissues. Patient presents with good manual tolerance and pelvic floor relaxation during treatment. Patient did not use her muscle relaxer/pain relief cream today and tolerated session well. Discussed different HEP strategies to see if it helped with progress with therapy. Patient is progressing well with therapy and pelvic floor therapy remains medically necessary for patient's reproductive and gynecological health. Pt will continue to benefit from skilled outpatient physical therapy to address the deficits listed in the problem list box on initial evaluation, provide pt/family education and to maximize pt's level of independence in the home and community environment.     Cuong Is progressing well towards her goals.   Pt prognosis is Good.     Pt will continue to benefit from skilled outpatient physical therapy to address the deficits listed in the problem list box on initial evaluation, provide pt/family education and to maximize pt's level of  independence in the home and community environment.     Pt's spiritual, cultural and educational needs considered and pt agreeable to plan of care and goals.     Anticipated barriers to physical therapy: none     Goals:   Short Term Goals: 4 weeks   Pt to report a decrease in pain to no more than 3/10 at it's worst with internal treatment for decreased tissue sensitivity.  progressing  Pt will report minimal to no pain with single digit pelvic assessment and display relaxation demonstrating improving pelvic floor muscle relaxation and coordination. progressing     Long Term Goals: 12 weeks ,   Pt to be discharged with home plan for carry over after discharge. progressing  Pt to demonstrate an improved score in the FOTO PFDI Pain survey  to at least 0 limitation to demonstrate improving ability to participate in activities of daily living with reduced pelvic floor pain.  progressing  Patient to report having penetrative intercourse with no pain for maximal quality of life. progressing    Plan     Plan of care Certification: 2/5/2024 to 5/5/2024.     Outpatient Physical Therapy 1-2 times weekly for 12 weeks to include the following interventions: therapeutic exercises, therapeutic activity, neuromuscular re-education, gait training, manual therapy, modalities PRN, patient/family education, and self care/home management,  and  dry needling.        Nicol Fry, PT

## 2024-04-01 ENCOUNTER — CLINICAL SUPPORT (OUTPATIENT)
Dept: REHABILITATION | Facility: HOSPITAL | Age: 29
End: 2024-04-01
Payer: COMMERCIAL

## 2024-04-01 DIAGNOSIS — N94.819 VULVODYNIA: Primary | ICD-10-CM

## 2024-04-01 PROCEDURE — 97140 MANUAL THERAPY 1/> REGIONS: CPT | Mod: PN

## 2024-04-01 PROCEDURE — 97112 NEUROMUSCULAR REEDUCATION: CPT | Mod: PN

## 2024-04-01 PROCEDURE — 97530 THERAPEUTIC ACTIVITIES: CPT | Mod: PN

## 2024-04-08 NOTE — PROGRESS NOTES
[facility]   Physical Therapy Treatment Note      Name: Cuong Aguayo  Clinic Number: [id number]    Therapy Diagnosis:   Encounter Diagnosis   Name Primary?    Pelvic floor dysfunction in female Yes       Physician: Pedro Mcdonough Jr., MD    Visit Date: 4/9/2024    Physician Orders: PT Eval and Treat   Medical Diagnosis from Referral: M62.89 (ICD-10-CM) - Muscle hypertonicity   Evaluation Date: 2/5/2024  Authorization Period Expiration: 1/17/2025  Plan of Care Expiration: 5/5/2024  Progress Note Due: 4/14/2024  Visit # / Visits authorized: 8/ 20   FOTO: 1/3     Precautions: Standard     Time In: 9:45 AM    Time Out: 10:45 AM   Total Billable Time: 60 minutes      Subjective     Pt reports: no changes since last visit.   She was compliant with home exercise program.  Response to previous treatment: a little sore, but had pelvic exam the day before   Functional change: still on 7th dilator but able to insert farther     Pain: 0/10 at rest      Objective      Objective Measures updated at progress report unless specified.     VAGINAL PELVIC FLOOR EXAM     EXTERNAL ASSESSMENT  Introitus: WNL  Skin condition: WNL  Scarring: did not formally assess today    Sensation: WNL   Pain: no pain reported during external treatment                            INTERNAL ASSESSMENT  Pain: trigger points as follows: bulbocavernosus and levator ani; max pain of 3/10    Sensation: able to localized pressure appropriately   Vaginal vault: stenotic   Muscle Bulk: decreased hypertonicity noted   Comment: improved pelvic floor relaxation during treatment; increased ease of insertion    Treatment   Cuong received the treatments listed below:    Pt verbally consents to intravaginal treatment today.  Signed consent form already on file.     Therapeutic Exercise to develop  strength, endurance, ROM, flexibility, posture, and core stabilization for 0 minutes including: none     Deferred: Happy baby 2 x 1 min  Butterfly  stretch x 1 min   Eusebio stretch x 1 min each     Neuromuscular Re-education to develop Coordination, Control, and Down training for 8 minutes including:   Diaphragmatic breathing for down training and relaxation of pelvic floor     Manual Therapy to develop flexibility, extensibility, and desensitization for 44 minutes including:   Internal myofascial release of levator ani, bulbocavernosus, ischiocavernosus   External myofascial release of bulbocavernosus and ischiocavernosus   trigger point/myofascial release of bilateral  hip flexors and adductors     Deferred:   Myofascial release of bilateral piriformis and coccyx musculature     Therapeutic Activity Patient participated in dynamic functional therapeutic activities to improve functional performance for 8 minutes. Including: Education as described below.  Plan of care/progress with therapy  HEP     Home Exercises Provided and Patient Education Provided     [x] Instruction on self-release to superficial PFM to reduce discomfort with initial penetration - pt able to return demonstrate independently after initial instruction  [] Discussion of intercourse considerations for pelvic pain - lubricant, positioning, pillows, pelvic floor massage  [] Instruction on urge suppression techniques  [x] Instruction on diaphragmatic breathing and hip-opening stretches for pelvic floor relaxation  [x] Education on visual cues/mental imagery for pelvic floor relaxation  [] Education on techniques to reduce post-void dribble  [] Instruction on the Knack for reduction of stress urinary incontinence  [] Instruction on log-roll technique for transfers to reduce strain on back/abdominal wall, reduce incidence of incontinence  [] Instruction on pressure management to protect pelvic organ prolapse/prevent incontinence  [] Education on voiding optimization - seated on toilet, no pushing, pelvic floor squeeze upon completion  [] Education on bowel movement optimization - positioning,  toilet stool, breath coordination, pelvic floor relaxation, abdominal wall bracing  [x] HEP building/HEP review  [x] Discussed progression of plan of care with patient    Education provided:   anatomy/physiology of pelvic floor, posture/body mechanices, dilator use, diaphragmatic breathing, and perineal stretching/massage  Discussed progression of plan of care with patient; educated pt in activity modification; reviewed HEP with pt. Pt demonstrated and verbalized understanding of all instruction and was provided with a handout of HEP (see Patient Instructions).    Written Home Exercises Provided: Patient instructed to cont prior HEP.  Exercises were reviewed and Cuong was able to demonstrate them prior to the end of the session.  Cuong demonstrated good  understanding of the education provided.     See EMR under Patient Instructions for exercises provided prior visit.      Assessment     Patient presents being complaint with HEP and being able to insert 7th dilator farther. Focused on manual therapy for increased myofascial mobility and desensitization of tissues. Patient presents with good manual tolerance and pelvic floor relaxation during treatment. Patient did use her muscle relaxer/pain relief cream today and tolerated session well. She reports decreased pain with internal treatment today. Patient is progressing well with therapy and pelvic floor therapy remains medically necessary for patient's reproductive and gynecological health. Pt will continue to benefit from skilled outpatient physical therapy to address the deficits listed in the problem list box on initial evaluation, provide pt/family education and to maximize pt's level of independence in the home and community environment.     Cuong Is progressing well towards her goals.   Pt prognosis is Good.     Pt will continue to benefit from skilled outpatient physical therapy to address the deficits listed in the problem list box on initial evaluation,  provide pt/family education and to maximize pt's level of independence in the home and community environment.     Pt's spiritual, cultural and educational needs considered and pt agreeable to plan of care and goals.     Anticipated barriers to physical therapy: none     Goals:   Short Term Goals: 4 weeks   Pt to report a decrease in pain to no more than 3/10 at it's worst with internal treatment for decreased tissue sensitivity.  MET 4/9/2024  Pt will report minimal to no pain with single digit pelvic assessment and display relaxation demonstrating improving pelvic floor muscle relaxation and coordination. progressing     Long Term Goals: 12 weeks ,   Pt to be discharged with home plan for carry over after discharge. progressing  Pt to demonstrate an improved score in the FOTO PFDI Pain survey  to at least 0 limitation to demonstrate improving ability to participate in activities of daily living with reduced pelvic floor pain.  progressing  Patient to report having penetrative intercourse with no pain for maximal quality of life. progressing    Plan     Plan of care Certification: 2/5/2024 to 5/5/2024.     Outpatient Physical Therapy 1-2 times weekly for 12 weeks to include the following interventions: therapeutic exercises, therapeutic activity, neuromuscular re-education, gait training, manual therapy, modalities PRN, patient/family education, and self care/home management,  and  dry needling.        Nicol Fry, PT

## 2024-04-09 ENCOUNTER — CLINICAL SUPPORT (OUTPATIENT)
Dept: REHABILITATION | Facility: HOSPITAL | Age: 29
End: 2024-04-09
Payer: COMMERCIAL

## 2024-04-09 DIAGNOSIS — M62.89 PELVIC FLOOR DYSFUNCTION IN FEMALE: Primary | ICD-10-CM

## 2024-04-09 PROCEDURE — 97530 THERAPEUTIC ACTIVITIES: CPT | Mod: PN

## 2024-04-09 PROCEDURE — 97140 MANUAL THERAPY 1/> REGIONS: CPT | Mod: PN

## 2024-04-09 PROCEDURE — 97112 NEUROMUSCULAR REEDUCATION: CPT | Mod: PN

## 2024-04-10 ENCOUNTER — PATIENT MESSAGE (OUTPATIENT)
Dept: OBSTETRICS AND GYNECOLOGY | Facility: CLINIC | Age: 29
End: 2024-04-10
Payer: COMMERCIAL

## 2024-04-10 DIAGNOSIS — D21.9 FIBROID: Primary | ICD-10-CM

## 2024-04-12 NOTE — PROGRESS NOTES
[facility]   Physical Therapy Treatment Note      Name: Cuong Aguayo  Clinic Number: [id number]    Therapy Diagnosis:   Encounter Diagnosis   Name Primary?    Vulvodynia Yes     Physician: Pedro Mcdonough Jr., MD    Visit Date: 4/15/2024    Physician Orders: PT Eval and Treat   Medical Diagnosis from Referral: M62.89 (ICD-10-CM) - Muscle hypertonicity   Evaluation Date: 2/5/2024  Authorization Period Expiration: 1/17/2025  Plan of Care Expiration: 5/5/2024  Progress Note Due: 4/14/2024  Visit # / Visits authorized: 8/ 20   FOTO: 1/3     Precautions: Standard     Time In: 11:00 AM    Time Out: 11:45 AM   Total Billable Time: 45 minutes      Subjective     Pt reports: being able to insert half of the 7th dilator.   She was compliant with home exercise program.  Response to previous treatment: a little sore, but had pelvic exam the day before   Functional change: still on 7th dilator but able to insert farther     Pain: 0/10 at rest      Objective      Objective Measures updated at progress report unless specified.     VAGINAL PELVIC FLOOR EXAM     EXTERNAL ASSESSMENT  Introitus: WNL  Skin condition: WNL  Scarring: did not formally assess today    Sensation: WNL   Pain: no pain reported during external treatment                            INTERNAL ASSESSMENT  Pain: trigger points as follows: bulbocavernosus and levator ani; max pain of 3/10    Sensation: able to localized pressure appropriately   Vaginal vault: stenotic   Muscle Bulk: decreased hypertonicity noted   Comment: improved pelvic floor relaxation during treatment; increased ease of insertion    Treatment   Cuong received the treatments listed below:    Pt verbally consents to intravaginal treatment today.  Signed consent form already on file.     Therapeutic Exercise to develop  strength, endurance, ROM, flexibility, posture, and core stabilization for 0 minutes including: none     Deferred: Happy baby 2 x 1 min  Butterfly stretch  x 1 min   Eusebio stretch x 1 min each     Neuromuscular Re-education to develop Coordination, Control, and Down training for 8 minutes including:   Diaphragmatic breathing for down training and relaxation of pelvic floor     Manual Therapy to develop flexibility, extensibility, and desensitization for 27 minutes including:   Internal myofascial release of levator ani, bulbocavernosus, ischiocavernosus   External myofascial release of bulbocavernosus and ischiocavernosus   trigger point/myofascial release of bilateral  hip flexors and adductors     Deferred:   Myofascial release of bilateral piriformis and coccyx musculature     Therapeutic Activity Patient participated in dynamic functional therapeutic activities to improve functional performance for 10 minutes. Including: Education as described below.  Plan of care/progress with therapy  HEP- dilator use in different positions and increased frequency   [place] positions     Home Exercises Provided and Patient Education Provided     [x] Instruction on self-release to superficial PFM to reduce discomfort with initial penetration - pt able to return demonstrate independently after initial instruction  [] Discussion of intercourse considerations for pelvic pain - lubricant, positioning, pillows, pelvic floor massage  [] Instruction on urge suppression techniques  [x] Instruction on diaphragmatic breathing and hip-opening stretches for pelvic floor relaxation  [x] Education on visual cues/mental imagery for pelvic floor relaxation  [] Education on techniques to reduce post-void dribble  [] Instruction on the Knack for reduction of stress urinary incontinence  [] Instruction on log-roll technique for transfers to reduce strain on back/abdominal wall, reduce incidence of incontinence  [] Instruction on pressure management to protect pelvic organ prolapse/prevent incontinence  [] Education on voiding optimization - seated on toilet, no pushing, pelvic floor squeeze  upon completion  [] Education on bowel movement optimization - positioning, toilet stool, breath coordination, pelvic floor relaxation, abdominal wall bracing  [x] HEP building/HEP review  [x] Discussed progression of plan of care with patient    Education provided:   anatomy/physiology of pelvic floor, posture/body mechanices, dilator use, diaphragmatic breathing, and perineal stretching/massage  Discussed progression of plan of care with patient; educated pt in activity modification; reviewed HEP with pt. Pt demonstrated and verbalized understanding of all instruction and was provided with a handout of HEP (see Patient Instructions).    Written Home Exercises Provided: Patient instructed to cont prior HEP.  Exercises were reviewed and Cuong was able to demonstrate them prior to the end of the session.  Cuong demonstrated good  understanding of the education provided.     See EMR under Patient Instructions for exercises provided prior visit.      Assessment     Patient presents being complaint with HEP and being able to insert 7th dilator farther. Focused on manual therapy for increased myofascial mobility and desensitization of tissues. Patient presents with good manual tolerance and pelvic floor relaxation during treatment. She reports decreased pain with internal treatment today. Reviewed different positions for dilator use and intercourse. Also recommended increased frequency of dilator use. Patient is progressing well with therapy and pelvic floor therapy remains medically necessary for patient's reproductive and gynecological health. Pt will continue to benefit from skilled outpatient physical therapy to address the deficits listed in the problem list box on initial evaluation, provide pt/family education and to maximize pt's level of independence in the home and community environment.     Cuong Is progressing well towards her goals.   Pt prognosis is Good.     Pt will continue to benefit from skilled  outpatient physical therapy to address the deficits listed in the problem list box on initial evaluation, provide pt/family education and to maximize pt's level of independence in the home and community environment.     Pt's spiritual, cultural and educational needs considered and pt agreeable to plan of care and goals.     Anticipated barriers to physical therapy: none     Goals:   Short Term Goals: 4 weeks   Pt to report a decrease in pain to no more than 3/10 at it's worst with internal treatment for decreased tissue sensitivity.  MET 4/9/2024  Pt will report minimal to no pain with single digit pelvic assessment and display relaxation demonstrating improving pelvic floor muscle relaxation and coordination. progressing     Long Term Goals: 12 weeks ,   Pt to be discharged with home plan for carry over after discharge. progressing  Pt to demonstrate an improved score in the FOTO PFDI Pain survey  to at least 0 limitation to demonstrate improving ability to participate in activities of daily living with reduced pelvic floor pain.  progressing  Patient to report having penetrative intercourse with no pain for maximal quality of life. progressing    Plan     Plan of care Certification: 2/5/2024 to 5/5/2024.     Outpatient Physical Therapy 1-2 times weekly for 12 weeks to include the following interventions: therapeutic exercises, therapeutic activity, neuromuscular re-education, gait training, manual therapy, modalities PRN, patient/family education, and self care/home management,  and  dry needling.        Nicol Fry, PT

## 2024-04-15 ENCOUNTER — HOSPITAL ENCOUNTER (OUTPATIENT)
Dept: RADIOLOGY | Facility: HOSPITAL | Age: 29
Discharge: HOME | End: 2024-04-15
Attending: OBSTETRICS & GYNECOLOGY
Payer: COMMERCIAL

## 2024-04-15 ENCOUNTER — CLINICAL SUPPORT (OUTPATIENT)
Dept: REHABILITATION | Facility: HOSPITAL | Age: 29
End: 2024-04-15
Payer: COMMERCIAL

## 2024-04-15 DIAGNOSIS — N94.819 VULVODYNIA: Primary | ICD-10-CM

## 2024-04-15 DIAGNOSIS — D21.9 FIBROID: ICD-10-CM

## 2024-04-15 PROCEDURE — 97140 MANUAL THERAPY 1/> REGIONS: CPT | Mod: PN

## 2024-04-15 PROCEDURE — 97112 NEUROMUSCULAR REEDUCATION: CPT | Mod: PN

## 2024-04-15 PROCEDURE — 97530 THERAPEUTIC ACTIVITIES: CPT | Mod: PN

## 2024-04-15 PROCEDURE — 76856 US EXAM PELVIC COMPLETE: CPT | Mod: TC,PO

## 2024-04-15 PROCEDURE — 76856 US EXAM PELVIC COMPLETE: CPT | Mod: 26,,, | Performed by: RADIOLOGY

## 2024-04-22 NOTE — PROGRESS NOTES
[facility]   Physical Therapy Treatment Note      Name: Cuong Aguayo  Clinic Number: [id number]    Therapy Diagnosis:   Encounter Diagnosis   Name Primary?    Vulvodynia Yes       Physician: Pedro Mcdonough Jr., MD    Visit Date: 4/23/2024    Physician Orders: PT Eval and Treat   Medical Diagnosis from Referral: M62.89 (ICD-10-CM) - Muscle hypertonicity   Evaluation Date: 2/5/2024  Authorization Period Expiration: 1/17/2025  Plan of Care Expiration: 5/5/2024  Progress Note Due: 4/14/2024  Visit # / Visits authorized: 10/ 20   FOTO: 1/3     Precautions: Standard     Time In: 2:30 PM    Time Out: 3:30 PM   Total Billable Time: 60 minutes      Subjective     Pt reports: attempting intercourse but her fiance unable to insert.   She was compliant with home exercise program.  Response to previous treatment: no soreness reported   Functional change: still on 7th dilator but able to insert farther;  fiance unable to insert    Pain: 0/10 at rest      Objective      Objective Measures updated at progress report unless specified.     VAGINAL PELVIC FLOOR EXAM     EXTERNAL ASSESSMENT  Introitus: WNL  Skin condition: WNL  Scarring: did not formally assess today    Sensation: WNL   Pain: no pain reported during external treatment                            INTERNAL ASSESSMENT  Pain: trigger points as follows: bulbocavernosus and levator ani; max pain of 3/10    Sensation: able to localized pressure appropriately   Vaginal vault: stenotic   Muscle Bulk: decreased hypertonicity noted   Comment: improved pelvic floor relaxation during treatment; increased ease of insertion    Treatment   Cuong received the treatments listed below:    Pt verbally consents to intravaginal treatment today.  Signed consent form already on file.     Therapeutic Exercise to develop  strength, endurance, ROM, flexibility, posture, and core stabilization for 0 minutes including: none     Deferred: Happy baby 2 x 1 min  Butterfly  stretch x 1 min   Eusebio stretch x 1 min each     Neuromuscular Re-education to develop Coordination, Control, and Down training for 8 minutes including:   Diaphragmatic breathing for down training and relaxation of pelvic floor     Manual Therapy to develop flexibility, extensibility, and desensitization for 42 minutes including:   Internal myofascial release of levator ani, bulbocavernosus, ischiocavernosus   External myofascial release of bulbocavernosus and ischiocavernosus   trigger point/myofascial release of bilateral  hip flexors and adductors     Deferred:   Myofascial release of bilateral piriformis and coccyx musculature     Therapeutic Activity Patient participated in dynamic functional therapeutic activities to improve functional performance for 10 minutes. Including: Education as described below.  Plan of care/progress with therapy  HEP- dilator use in different positions and increased frequency   [place] positions     Home Exercises Provided and Patient Education Provided     [x] Instruction on self-release to superficial PFM to reduce discomfort with initial penetration - pt able to return demonstrate independently after initial instruction  [] Discussion of intercourse considerations for pelvic pain - lubricant, positioning, pillows, pelvic floor massage  [] Instruction on urge suppression techniques  [x] Instruction on diaphragmatic breathing and hip-opening stretches for pelvic floor relaxation  [x] Education on visual cues/mental imagery for pelvic floor relaxation  [] Education on techniques to reduce post-void dribble  [] Instruction on the Knack for reduction of stress urinary incontinence  [] Instruction on log-roll technique for transfers to reduce strain on back/abdominal wall, reduce incidence of incontinence  [] Instruction on pressure management to protect pelvic organ prolapse/prevent incontinence  [] Education on voiding optimization - seated on toilet, no pushing, pelvic floor  squeeze upon completion  [] Education on bowel movement optimization - positioning, toilet stool, breath coordination, pelvic floor relaxation, abdominal wall bracing  [x] HEP building/HEP review  [x] Discussed progression of plan of care with patient    Education provided:   anatomy/physiology of pelvic floor, posture/body mechanices, dilator use, diaphragmatic breathing, and perineal stretching/massage  Discussed progression of plan of care with patient; educated pt in activity modification; reviewed HEP with pt. Pt demonstrated and verbalized understanding of all instruction and was provided with a handout of HEP (see Patient Instructions).    Written Home Exercises Provided: Patient instructed to cont prior HEP.  Exercises were reviewed and Cuong was able to demonstrate them prior to the end of the session.  Cuong demonstrated good  understanding of the education provided.     See EMR under Patient Instructions for exercises provided prior visit.      Assessment     Patient presents being complaint with HEP and attempted intercourse with fiance but unable to insert. Focused on manual therapy for increased myofascial mobility and desensitization of tissues. Reviewed different positions for dilator use and intercourse. Patient is progressing well with therapy and pelvic floor therapy remains medically necessary for patient's reproductive and gynecological health. Pt will continue to benefit from skilled outpatient physical therapy to address the deficits listed in the problem list box on initial evaluation, provide pt/family education and to maximize pt's level of independence in the home and community environment.     Cuong Is progressing well towards her goals.   Pt prognosis is Good.     Pt will continue to benefit from skilled outpatient physical therapy to address the deficits listed in the problem list box on initial evaluation, provide pt/family education and to maximize pt's level of independence in the  home and community environment.     Pt's spiritual, cultural and educational needs considered and pt agreeable to plan of care and goals.     Anticipated barriers to physical therapy: none     Goals:   Short Term Goals: 4 weeks   Pt to report a decrease in pain to no more than 3/10 at it's worst with internal treatment for decreased tissue sensitivity.  MET 4/9/2024  Pt will report minimal to no pain with single digit pelvic assessment and display relaxation demonstrating improving pelvic floor muscle relaxation and coordination. progressing     Long Term Goals: 12 weeks ,   Pt to be discharged with home plan for carry over after discharge. progressing  Pt to demonstrate an improved score in the FOTO PFDI Pain survey  to at least 0 limitation to demonstrate improving ability to participate in activities of daily living with reduced pelvic floor pain.  progressing  Patient to report having penetrative intercourse with no pain for maximal quality of life. progressing    Plan     Plan of care Certification: 2/5/2024 to 5/5/2024.     Outpatient Physical Therapy 1-2 times weekly for 12 weeks to include the following interventions: therapeutic exercises, therapeutic activity, neuromuscular re-education, gait training, manual therapy, modalities PRN, patient/family education, and self care/home management,  and  dry needling.        Nicol Fry, PT

## 2024-04-23 ENCOUNTER — CLINICAL SUPPORT (OUTPATIENT)
Dept: REHABILITATION | Facility: HOSPITAL | Age: 29
End: 2024-04-23
Payer: COMMERCIAL

## 2024-04-23 DIAGNOSIS — N94.819 VULVODYNIA: Primary | ICD-10-CM

## 2024-04-23 PROCEDURE — 97530 THERAPEUTIC ACTIVITIES: CPT | Mod: PN

## 2024-04-23 PROCEDURE — 97112 NEUROMUSCULAR REEDUCATION: CPT | Mod: PN

## 2024-04-23 PROCEDURE — 97140 MANUAL THERAPY 1/> REGIONS: CPT | Mod: PN

## 2024-04-23 NOTE — PATIENT INSTRUCTIONS
Positioning for Intimacy  Different positions work for different people, depending on that nature of your pelvic pain. If any tension in the pelvic floor makes vaginal penetration uncomfortable, finding a position that allows for full relaxation and opening is beneficial. If having more control over the speed and depth of penetration makes you feel more comfortable, then being on top or having your hand available to control penetration works better. Here are two positions that can help get you started, but you'll have to figure out what works best for you!

## 2024-05-06 NOTE — PROGRESS NOTES
[facility]   Physical Therapy Updated Plan of Care      Name: Cuong Aguayo  Clinic Number: [id number]    Therapy Diagnosis:   Encounter Diagnosis   Name Primary?    Vulvodynia Yes       Physician: Pedro Mcdonough Jr., MD    Visit Date: 5/7/2024    Physician Orders: PT Eval and Treat   Medical Diagnosis from Referral: M62.89 (ICD-10-CM) - Muscle hypertonicity   Evaluation Date: 2/5/2024  Authorization Period Expiration: 1/17/2025  Plan of Care Expiration: 8/7/2024  Progress Note Due: 6/7/2024  Visit # / Visits authorized: 11/ 20   FOTO: 1/3     Precautions: Standard     Time In: 9:45 AM    Time Out: 10:55 AM   Total Billable Time: 70 minutes      Subjective     Pt reports: trying dilator work on her side and being able to insert 7th dilator more, but feeling some pinching on left side.   She was compliant with home exercise program.  Response to previous treatment: no soreness reported   Functional change: still on 7th dilator but able to insert farther    Pain: 0/10 at rest      Objective      Objective Measures updated at progress report unless specified.     VAGINAL PELVIC FLOOR EXAM     EXTERNAL ASSESSMENT  Introitus: with in normal limits (externally)   Skin condition: WNL   Sensation: WNL   Pain: no pain reported during external treatment                            INTERNAL ASSESSMENT  Pain: trigger points as follows: bulbocavernosus; max pain of 8/10    Sensation: able to localized pressure appropriately   Vaginal vault: with in normal limits; but at vaginal introitus: stenotic   Muscle Bulk: decreased hypertonicity noted   Comment: improved pelvic floor relaxation during treatment; increased ease of insertion    Treatment   Cuong received the treatments listed below:    Pt verbally consents to intravaginal treatment today.  Signed consent form already on file.     Therapeutic Exercise to develop  strength, endurance, ROM, flexibility, posture, and core stabilization for 10 minutes  including: none   Happy baby 2 x 1 min  Butterfly stretch 2 x 1 min   Eusebio stretch 2 x 1 min each     Neuromuscular Re-education to develop Coordination, Control, and Down training for 8 minutes including:   Diaphragmatic breathing for down training and relaxation of pelvic floor     Manual Therapy to develop flexibility, extensibility, and desensitization for 42 minutes including:   Internal myofascial release of levator ani, bulbocavernosus, ischiocavernosus   External myofascial release of bulbocavernosus and ischiocavernosus   trigger point/myofascial release of bilateral  hip flexors and adductors     Deferred:   Myofascial release of bilateral piriformis and coccyx musculature     Therapeutic Activity Patient participated in dynamic functional therapeutic activities to improve functional performance for 10 minutes. Including: Education as described below.  Plan of care/progress with therapy  Media positions     Home Exercises Provided and Patient Education Provided     [x] Instruction on self-release to superficial PFM to reduce discomfort with initial penetration - pt able to return demonstrate independently after initial instruction  [] Discussion of intercourse considerations for pelvic pain - lubricant, positioning, pillows, pelvic floor massage  [] Instruction on urge suppression techniques  [x] Instruction on diaphragmatic breathing and hip-opening stretches for pelvic floor relaxation  [x] Education on visual cues/mental imagery for pelvic floor relaxation  [] Education on techniques to reduce post-void dribble  [] Instruction on the Knack for reduction of stress urinary incontinence  [] Instruction on log-roll technique for transfers to reduce strain on back/abdominal wall, reduce incidence of incontinence  [] Instruction on pressure management to protect pelvic organ prolapse/prevent incontinence  [] Education on voiding optimization - seated on toilet, no pushing, pelvic floor squeeze upon  completion  [] Education on bowel movement optimization - positioning, toilet stool, breath coordination, pelvic floor relaxation, abdominal wall bracing  [x] HEP building/HEP review  [x] Discussed progression of plan of care with patient    Education provided:   anatomy/physiology of pelvic floor, posture/body mechanices, dilator use, diaphragmatic breathing, and perineal stretching/massage  Discussed progression of plan of care with patient; educated pt in activity modification; reviewed HEP with pt. Pt demonstrated and verbalized understanding of all instruction and was provided with a handout of HEP (see Patient Instructions).    Written Home Exercises Provided: Patient instructed to cont prior HEP.  Exercises were reviewed and Cuong was able to demonstrate them prior to the end of the session.  Cuong demonstrated good  understanding of the education provided.     See EMR under Patient Instructions for exercises provided prior visit.      Assessment     Patient presents being complaint with HEP and being able to insert 7th dilator farther. Focused on manual therapy for increased myofascial mobility and desensitization of tissues. Increased pain reported with internal treatment, but increased pressure used during manual treatment. Manual therapy was followed with external stretches for farther muscle extensibility. Reviewed different positions for attempted intercourse/ insertion. Patient is progressing well with therapy and has met 2 out of 5 goals. Pelvic floor therapy remains medically necessary for patient's reproductive and gynecological health. Pt will continue to benefit from skilled outpatient physical therapy to address the deficits listed in the problem list box on initial evaluation, provide pt/family education and to maximize pt's level of independence in the home and community environment.     Cuong Is progressing well towards her goals.   Pt prognosis is Good.     Pt will continue to benefit from  skilled outpatient physical therapy to address the deficits listed in the problem list box on initial evaluation, provide pt/family education and to maximize pt's level of independence in the home and community environment.     Pt's spiritual, cultural and educational needs considered and pt agreeable to plan of care and goals.     Anticipated barriers to physical therapy: none     Goals:   Short Term Goals: 4 weeks   Pt to report a decrease in pain to no more than 3/10 at it's worst with internal treatment for decreased tissue sensitivity.  MET 4/9/2024  Pt will report minimal to no pain with single digit pelvic assessment and display relaxation demonstrating improving pelvic floor muscle relaxation and coordination. MET 5/7/2024     Long Term Goals: 12 weeks ,   Pt to be discharged with home plan for carry over after discharge. progressing  Pt to demonstrate an improved score in the FOTO PFDI Pain survey  to at least 0 limitation to demonstrate improving ability to participate in activities of daily living with reduced pelvic floor pain.  progressing  Patient to report having penetrative intercourse with no pain for maximal quality of life. progressing    Plan     Plan of care Certification: 2/5/2024 to 8/7/2024.     Outpatient Physical Therapy 1-2 times weekly for 12 weeks to include the following interventions: therapeutic exercises, therapeutic activity, neuromuscular re-education, gait training, manual therapy, modalities PRN, patient/family education, and self care/home management,  and  dry needling.        Nicol Fry, PT

## 2024-05-07 ENCOUNTER — CLINICAL SUPPORT (OUTPATIENT)
Dept: REHABILITATION | Facility: HOSPITAL | Age: 29
End: 2024-05-07
Payer: COMMERCIAL

## 2024-05-07 DIAGNOSIS — N94.819 VULVODYNIA: Primary | ICD-10-CM

## 2024-05-07 PROCEDURE — 97112 NEUROMUSCULAR REEDUCATION: CPT | Mod: PN

## 2024-05-07 PROCEDURE — 97110 THERAPEUTIC EXERCISES: CPT | Mod: PN

## 2024-05-07 PROCEDURE — 97530 THERAPEUTIC ACTIVITIES: CPT | Mod: PN

## 2024-05-07 PROCEDURE — 97140 MANUAL THERAPY 1/> REGIONS: CPT | Mod: PN

## 2024-05-07 NOTE — PATIENT INSTRUCTIONS
Positioning for Intimacy  Different positions work for different people, depending on that nature of your pelvic pain. If any tension in the pelvic floor makes vaginal penetration uncomfortable, finding a position that allows for full relaxation and opening is beneficial. If having more control over the speed and depth of penetration makes you feel more comfortable, then being on top or having your hand available to control penetration works better. Here are two positions that can help get you started, but you'll have to figure out what works best for you!             [email or web address]

## 2024-05-07 NOTE — PLAN OF CARE
[facility]   Physical Therapy Updated Plan of Care      Name: Cuong Aguayo  [facility] Number: [id number]    Therapy Diagnosis:   Encounter Diagnosis   Name Primary?    Vulvodynia Yes       Physician: Pedro Mcdonough Jr., MD    Visit Date: 5/7/2024    Physician Orders: PT Eval and Treat   Medical Diagnosis from Referral: M62.89 (ICD-10-CM) - Muscle hypertonicity   Evaluation Date: 2/5/2024  Authorization Period Expiration: 1/17/2025  Plan of Care Expiration: 8/7/2024  Progress Note Due: 6/7/2024  Visit # / Visits authorized: 11/ 20   FOTO: 1/3     Precautions: Standard     Time In: 9:45 AM    Time Out: 10:55 AM   Total Billable Time: 70 minutes      Subjective     Pt reports: trying dilator work on her side and being able to insert 7th dilator more, but feeling some pinching on left side.   She was compliant with home exercise program.  Response to previous treatment: no soreness reported   Functional change: still on 7th dilator but able to insert farther    Pain: 0/10 at rest      Objective      Objective Measures updated at progress report unless specified.     VAGINAL PELVIC FLOOR EXAM     EXTERNAL ASSESSMENT  Introitus: with in normal limits (externally)   Skin condition: WNL   Sensation: WNL   Pain: no pain reported during external treatment                            INTERNAL ASSESSMENT  Pain: trigger points as follows: bulbocavernosus; max pain of 8/10    Sensation: able to localized pressure appropriately   Vaginal vault: with in normal limits; but at vaginal introitus: stenotic   Muscle Bulk: decreased hypertonicity noted   Comment: improved pelvic floor relaxation during treatment; increased ease of insertion    Assessment     Patient presents being complaint with HEP and being able to insert 7th dilator farther. Focused on manual therapy for increased myofascial mobility and desensitization of tissues. Increased pain reported with internal treatment, but increased pressure used  during manual treatment. Manual therapy was followed with external stretches for farther muscle extensibility. Reviewed different positions for attempted intercourse/ insertion. Patient is progressing well with therapy and has met 2 out of 5 goals. Pelvic floor therapy remains medically necessary for patient's reproductive and gynecological health. Pt will continue to benefit from skilled outpatient physical therapy to address the deficits listed in the problem list box on initial evaluation, provide pt/family education and to maximize pt's level of independence in the home and community environment.     Cuong Is progressing well towards her goals.   Pt prognosis is Good.     Pt will continue to benefit from skilled outpatient physical therapy to address the deficits listed in the problem list box on initial evaluation, provide pt/family education and to maximize pt's level of independence in the home and community environment.     Pt's spiritual, cultural and educational needs considered and pt agreeable to plan of care and goals.     Anticipated barriers to physical therapy: none     Goals:   Short Term Goals: 4 weeks   Pt to report a decrease in pain to no more than 3/10 at it's worst with internal treatment for decreased tissue sensitivity.  MET 4/9/2024  Pt will report minimal to no pain with single digit pelvic assessment and display relaxation demonstrating improving pelvic floor muscle relaxation and coordination. MET 5/7/2024     Long Term Goals: 12 weeks ,   Pt to be discharged with home plan for carry over after discharge. progressing  Pt to demonstrate an improved score in the FOTO PFDI Pain survey  to at least 0 limitation to demonstrate improving ability to participate in activities of daily living with reduced pelvic floor pain.  progressing  Patient to report having penetrative intercourse with no pain for maximal quality of life. progressing    Plan     Plan of care Certification: 2/5/2024 to  8/7/2024.     Outpatient Physical Therapy 1-2 times weekly for 12 weeks to include the following interventions: therapeutic exercises, therapeutic activity, neuromuscular re-education, gait training, manual therapy, modalities PRN, patient/family education, and self care/home management,  and  dry needling.        Nicol Fry, PT

## 2024-05-14 NOTE — PROGRESS NOTES
[facility]   Physical Therapy Updated Plan of Care      Name: Cuong Aguayo  Clinic Number: [id number]    Therapy Diagnosis:   No diagnosis found.      Physician: Pedro Mcdonough Jr., MD    Visit Date: 5/16/2024    Physician Orders: PT Eval and Treat   Medical Diagnosis from Referral: M62.89 (ICD-10-CM) - Muscle hypertonicity   Evaluation Date: 2/5/2024  Authorization Period Expiration: 1/17/2025  Plan of Care Expiration: 8/7/2024  Progress Note Due: 6/7/2024  Visit # / Visits authorized: 12/ 20   FOTO: 1/3     Precautions: Standard     Time In: 9:50 AM    Time Out: 10:25 AM   Total Billable Time: 35 minutes      Subjective     Pt reports: attempting intercourse more frequently and would like to make her next visit her last.   She was compliant with home exercise program.  Response to previous treatment: no soreness reported   Functional change: still on 7th dilator but able to insert farther    Pain: 0/10 at rest      Objective      Objective Measures updated at progress report unless specified.     VAGINAL PELVIC FLOOR EXAM     EXTERNAL ASSESSMENT  Introitus: with in normal limits (externally)   Skin condition: WNL   Sensation: WNL   Pain: no pain reported during external treatment                            INTERNAL ASSESSMENT  Pain: trigger points as follows: bulbocavernosus; max pain of 5/10    Sensation: able to localized pressure appropriately   Vaginal vault: with in normal limits; but at vaginal introitus: stenotic   Muscle Bulk: decreased hypertonicity noted   Comment: improved pelvic floor relaxation during treatment; increased ease of insertion    Treatment   Cuong received the treatments listed below:    Pt verbally consents to intravaginal treatment today.  Signed consent form already on file.     Therapeutic Exercise to develop  strength, endurance, ROM, flexibility, posture, and core stabilization for 0 minutes including: none     Deferred:  Happy baby 2 x 1 min  Butterfly  stretch 2 x 1 min   Eusebio stretch 2 x 1 min each     Neuromuscular Re-education to develop Coordination, Control, and Down training for 8 minutes including:   Diaphragmatic breathing for down training and relaxation of pelvic floor     Manual Therapy to develop flexibility, extensibility, and desensitization for 19 minutes including:   Internal myofascial release of levator ani, bulbocavernosus, ischiocavernosus   External myofascial release of bulbocavernosus and ischiocavernosus     Deferred:   Myofascial release of bilateral piriformis and coccyx musculature   trigger point/myofascial release of bilateral  hip flexors and adductors     Therapeutic Activity Patient participated in dynamic functional therapeutic activities to improve functional performance for 8 minutes. Including: Education as described below.  Plan of care/progress with therapy- discharge next visit   [place] positions     Home Exercises Provided and Patient Education Provided     [x] Instruction on self-release to superficial PFM to reduce discomfort with initial penetration - pt able to return demonstrate independently after initial instruction  [] Discussion of intercourse considerations for pelvic pain - lubricant, positioning, pillows, pelvic floor massage  [] Instruction on urge suppression techniques  [x] Instruction on diaphragmatic breathing and hip-opening stretches for pelvic floor relaxation  [x] Education on visual cues/mental imagery for pelvic floor relaxation  [] Education on techniques to reduce post-void dribble  [] Instruction on the Knack for reduction of stress urinary incontinence  [] Instruction on log-roll technique for transfers to reduce strain on back/abdominal wall, reduce incidence of incontinence  [] Instruction on pressure management to protect pelvic organ prolapse/prevent incontinence  [] Education on voiding optimization - seated on toilet, no pushing, pelvic floor squeeze upon completion  [] Education on  bowel movement optimization - positioning, toilet stool, breath coordination, pelvic floor relaxation, abdominal wall bracing  [x] HEP building/HEP review  [x] Discussed progression of plan of care with patient    Education provided:   anatomy/physiology of pelvic floor, posture/body mechanices, dilator use, diaphragmatic breathing, and perineal stretching/massage  Discussed progression of plan of care with patient; educated pt in activity modification; reviewed HEP with pt. Pt demonstrated and verbalized understanding of all instruction and was provided with a handout of HEP (see Patient Instructions).    Written Home Exercises Provided: Patient instructed to cont prior HEP.  Exercises were reviewed and Cuong was able to demonstrate them prior to the end of the session.  Cuong demonstrated good  understanding of the education provided.     See EMR under Patient Instructions for exercises provided prior visit.      Assessment     Patient presents being complaint with HEP. Focused on manual therapy for increased myofascial mobility and desensitization of tissues. Decreased pain reported with internal treatment today. Patient feels she can manage her symptoms and progress on her own. Plan to discharge at next visit. Pt will continue to benefit from skilled outpatient physical therapy to address the deficits listed in the problem list box on initial evaluation, provide pt/family education and to maximize pt's level of independence in the home and community environment.     Cuong Is progressing well towards her goals.   Pt prognosis is Good.     Pt will continue to benefit from skilled outpatient physical therapy to address the deficits listed in the problem list box on initial evaluation, provide pt/family education and to maximize pt's level of independence in the home and community environment.     Pt's spiritual, cultural and educational needs considered and pt agreeable to plan of care and goals.     Anticipated  barriers to physical therapy: none     Goals:   Short Term Goals: 4 weeks   Pt to report a decrease in pain to no more than 3/10 at it's worst with internal treatment for decreased tissue sensitivity.  MET 4/9/2024  Pt will report minimal to no pain with single digit pelvic assessment and display relaxation demonstrating improving pelvic floor muscle relaxation and coordination. MET 5/7/2024     Long Term Goals: 12 weeks ,   Pt to be discharged with home plan for carry over after discharge. progressing  Pt to demonstrate an improved score in the FOTO PFDI Pain survey  to at least 0 limitation to demonstrate improving ability to participate in activities of daily living with reduced pelvic floor pain.  progressing  Patient to report having penetrative intercourse with no pain for maximal quality of life. progressing    Plan     Plan of care Certification: 2/5/2024 to 8/7/2024.     Outpatient Physical Therapy 1-2 times weekly for 12 weeks to include the following interventions: therapeutic exercises, therapeutic activity, neuromuscular re-education, gait training, manual therapy, modalities PRN, patient/family education, and self care/home management,  and  dry needling.        Nicol Fry, PT

## 2024-05-16 ENCOUNTER — CLINICAL SUPPORT (OUTPATIENT)
Dept: REHABILITATION | Facility: HOSPITAL | Age: 29
End: 2024-05-16
Payer: COMMERCIAL

## 2024-05-16 DIAGNOSIS — N94.819 VULVODYNIA: Primary | ICD-10-CM

## 2024-05-16 PROCEDURE — 97140 MANUAL THERAPY 1/> REGIONS: CPT | Mod: PN

## 2024-05-16 PROCEDURE — 97112 NEUROMUSCULAR REEDUCATION: CPT | Mod: PN

## 2024-05-16 PROCEDURE — 97530 THERAPEUTIC ACTIVITIES: CPT | Mod: PN

## 2024-05-21 NOTE — PROGRESS NOTES
[facility]   Physical Therapy/ Discharge Summary      Name: Cuong Aguayo  [facility] Number: [id number]    Therapy Diagnosis:   Encounter Diagnosis   Name Primary?    Vulvodynia Yes       Physician: Pedro Mcdonough Jr., MD    Visit Date: 5/22/2024    Physician Orders: PT Eval and Treat   Medical Diagnosis from Referral: M62.89 (ICD-10-CM) - Muscle hypertonicity   Evaluation Date: 2/5/2024  Authorization Period Expiration: 1/17/2025  Plan of Care Expiration: 8/7/2024  Progress Note Due: 6/7/2024  Visit # / Visits authorized: 12/ 20   FOTO: 1/3     Precautions: Standard     Time In: 2:36 PM    Time Out: 3:16 PM    Total Billable Time: 40 minutes      Subjective     Pt reports: no new complaints and ready for discharge today.   She was compliant with home exercise program.  Response to previous treatment: no soreness reported   Functional change: still on 7th dilator but able to insert farther    Pain: 0/10 at rest      Objective      Objective Measures updated at progress report unless specified.     VAGINAL PELVIC FLOOR EXAM     EXTERNAL ASSESSMENT  Introitus: with in normal limits (externally)   Skin condition: WNL   Sensation: WNL   Pain: no pain reported during external treatment                            INTERNAL ASSESSMENT  Pain: trigger points as follows: bulbocavernosus; max pain of 5/10    Sensation: able to localized pressure appropriately   Vaginal vault: with in normal limits  Muscle Bulk: decreased hypertonicity noted   Comment: improved pelvic floor relaxation during treatment; increased ease of insertion    Treatment   Cuong received the treatments listed below:    Pt verbally consents to intravaginal treatment today.  Signed consent form already on file.     Therapeutic Exercise to develop  strength, endurance, ROM, flexibility, posture, and core stabilization for 0 minutes including: none     Deferred:  Happy baby 2 x 1 min  Butterfly stretch 2 x 1 min   Eusebio stretch 2 x 1  min each     Neuromuscular Re-education to develop Coordination, Control, and Down training for 8 minutes including:   Diaphragmatic breathing for down training and relaxation of pelvic floor     Manual Therapy to develop flexibility, extensibility, and desensitization for 24 minutes including:   Internal myofascial release of levator ani, bulbocavernosus, ischiocavernosus   External myofascial release of bulbocavernosus and ischiocavernosus     Deferred:   Myofascial release of bilateral piriformis and coccyx musculature   trigger point/myofascial release of bilateral  hip flexors and adductors     Therapeutic Activity Patient participated in dynamic functional therapeutic activities to improve functional performance for 8 minutes. Including: Education as described below.  Plan of care/progress with therapy- discharge     Home Exercises Provided and Patient Education Provided     [x] Instruction on self-release to superficial PFM to reduce discomfort with initial penetration - pt able to return demonstrate independently after initial instruction  [] Discussion of intercourse considerations for pelvic pain - lubricant, positioning, pillows, pelvic floor massage  [] Instruction on urge suppression techniques  [x] Instruction on diaphragmatic breathing and hip-opening stretches for pelvic floor relaxation  [x] Education on visual cues/mental imagery for pelvic floor relaxation  [] Education on techniques to reduce post-void dribble  [] Instruction on the Knack for reduction of stress urinary incontinence  [] Instruction on log-roll technique for transfers to reduce strain on back/abdominal wall, reduce incidence of incontinence  [] Instruction on pressure management to protect pelvic organ prolapse/prevent incontinence  [] Education on voiding optimization - seated on toilet, no pushing, pelvic floor squeeze upon completion  [] Education on bowel movement optimization - positioning, toilet stool, breath coordination,  pelvic floor relaxation, abdominal wall bracing  [x] HEP building/HEP review  [x] Discussed progression of plan of care with patient    Education provided:   anatomy/physiology of pelvic floor, posture/body mechanices, dilator use, diaphragmatic breathing, and perineal stretching/massage  Discussed progression of plan of care with patient; educated pt in activity modification; reviewed HEP with pt. Pt demonstrated and verbalized understanding of all instruction and was provided with a handout of HEP (see Patient Instructions).    Written Home Exercises Provided: Patient instructed to cont prior HEP.  Exercises were reviewed and Cuong was able to demonstrate them prior to the end of the session.  Edinnorma demonstrated good  understanding of the education provided.     See EMR under Patient Instructions for exercises provided prior visit.      Assessment     Patient presents with no new complaints and asking for discharge today. Patient has not met all of her goals, but would like to try managing symptoms on her own. Patient is being discharged from pelvic floor therapy at this time.     Pt's spiritual, cultural and educational needs considered and pt agreeable to plan of care and goals.     Anticipated barriers to physical therapy: none     Goals:   Short Term Goals: 4 weeks   Pt to report a decrease in pain to no more than 3/10 at it's worst with internal treatment for decreased tissue sensitivity.  MET 4/9/2024  Pt will report minimal to no pain with single digit pelvic assessment and display relaxation demonstrating improving pelvic floor muscle relaxation and coordination. MET 5/7/2024     Long Term Goals: 12 weeks ,   Pt to be discharged with home plan for carry over after discharge. MET   Pt to demonstrate an improved score in the FOTO PFDI Pain survey  to at least 0 limitation to demonstrate improving ability to participate in activities of daily living with reduced pelvic floor pain.  Not Met   Patient to  report having penetrative intercourse with no pain for maximal quality of life. Not met     Plan     Plan of care Certification: 2/5/2024 to 8/7/2024.     Outpatient Physical Therapy 1-2 times weekly for 12 weeks to include the following interventions: therapeutic exercises, therapeutic activity, neuromuscular re-education, gait training, manual therapy, modalities PRN, patient/family education, and self care/home management,  and  dry needling.        Nicol Fry, PT

## 2024-05-22 ENCOUNTER — CLINICAL SUPPORT (OUTPATIENT)
Dept: REHABILITATION | Facility: HOSPITAL | Age: 29
End: 2024-05-22
Payer: COMMERCIAL

## 2024-05-22 DIAGNOSIS — N94.819 VULVODYNIA: Primary | ICD-10-CM

## 2024-05-22 PROCEDURE — 97112 NEUROMUSCULAR REEDUCATION: CPT | Mod: PN

## 2024-05-22 PROCEDURE — 97530 THERAPEUTIC ACTIVITIES: CPT | Mod: PN

## 2024-05-22 PROCEDURE — 97140 MANUAL THERAPY 1/> REGIONS: CPT | Mod: PN

## 2024-07-09 ENCOUNTER — PATIENT MESSAGE (OUTPATIENT)
Dept: RESEARCH | Facility: HOSPITAL | Age: 29
End: 2024-07-09
Payer: COMMERCIAL

## 2024-11-06 ENCOUNTER — PATIENT MESSAGE (OUTPATIENT)
Dept: FAMILY MEDICINE | Facility: CLINIC | Age: 29
End: 2024-11-06
Payer: COMMERCIAL

## 2025-01-31 ENCOUNTER — OFFICE VISIT (OUTPATIENT)
Dept: FAMILY MEDICINE | Facility: CLINIC | Age: 30
End: 2025-01-31
Payer: COMMERCIAL

## 2025-01-31 ENCOUNTER — LAB VISIT (OUTPATIENT)
Dept: LAB | Facility: HOSPITAL | Age: 30
End: 2025-01-31
Attending: FAMILY MEDICINE
Payer: COMMERCIAL

## 2025-01-31 VITALS
HEIGHT: 64 IN | BODY MASS INDEX: 29.06 KG/M2 | HEART RATE: 60 BPM | DIASTOLIC BLOOD PRESSURE: 70 MMHG | SYSTOLIC BLOOD PRESSURE: 114 MMHG | OXYGEN SATURATION: 99 % | TEMPERATURE: 98 F | RESPIRATION RATE: 19 BRPM | WEIGHT: 170.19 LBS

## 2025-01-31 DIAGNOSIS — J45.990 EXERCISE-INDUCED ASTHMA: ICD-10-CM

## 2025-01-31 DIAGNOSIS — Z13.220 ENCOUNTER FOR LIPID SCREENING FOR CARDIOVASCULAR DISEASE: ICD-10-CM

## 2025-01-31 DIAGNOSIS — Z00.00 ANNUAL PHYSICAL EXAM: ICD-10-CM

## 2025-01-31 DIAGNOSIS — R73.9 HYPERGLYCEMIA: ICD-10-CM

## 2025-01-31 DIAGNOSIS — D50.0 IRON DEFICIENCY ANEMIA DUE TO CHRONIC BLOOD LOSS: ICD-10-CM

## 2025-01-31 DIAGNOSIS — Z00.00 ANNUAL PHYSICAL EXAM: Primary | ICD-10-CM

## 2025-01-31 DIAGNOSIS — Z28.21 PNEUMOCOCCAL VACCINATION DECLINED: ICD-10-CM

## 2025-01-31 DIAGNOSIS — L20.82 FLEXURAL ECZEMA: Chronic | ICD-10-CM

## 2025-01-31 DIAGNOSIS — Z13.6 ENCOUNTER FOR LIPID SCREENING FOR CARDIOVASCULAR DISEASE: ICD-10-CM

## 2025-01-31 DIAGNOSIS — Z23 NEED FOR VACCINATION: ICD-10-CM

## 2025-01-31 LAB
ALBUMIN SERPL BCP-MCNC: 3.8 G/DL (ref 3.5–5.2)
ALP SERPL-CCNC: 66 U/L (ref 40–150)
ALT SERPL W/O P-5'-P-CCNC: 5 U/L (ref 10–44)
ANION GAP SERPL CALC-SCNC: 7 MMOL/L (ref 8–16)
AST SERPL-CCNC: 14 U/L (ref 10–40)
BASOPHILS # BLD AUTO: 0.04 K/UL (ref 0–0.2)
BASOPHILS NFR BLD: 0.6 % (ref 0–1.9)
BILIRUB SERPL-MCNC: 0.3 MG/DL (ref 0.1–1)
BUN SERPL-MCNC: 9 MG/DL (ref 6–20)
CALCIUM SERPL-MCNC: 9.1 MG/DL (ref 8.7–10.5)
CHLORIDE SERPL-SCNC: 107 MMOL/L (ref 95–110)
CHOLEST SERPL-MCNC: 148 MG/DL (ref 120–199)
CHOLEST/HDLC SERPL: 2.1 {RATIO} (ref 2–5)
CO2 SERPL-SCNC: 23 MMOL/L (ref 23–29)
CREAT SERPL-MCNC: 0.9 MG/DL (ref 0.5–1.4)
DIFFERENTIAL METHOD BLD: ABNORMAL
EOSINOPHIL # BLD AUTO: 0.1 K/UL (ref 0–0.5)
EOSINOPHIL NFR BLD: 1.2 % (ref 0–8)
ERYTHROCYTE [DISTWIDTH] IN BLOOD BY AUTOMATED COUNT: 17.2 % (ref 11.5–14.5)
EST. GFR  (NO RACE VARIABLE): >60 ML/MIN/1.73 M^2
FERRITIN SERPL-MCNC: 9 NG/ML (ref 20–300)
GLUCOSE SERPL-MCNC: 80 MG/DL (ref 70–110)
HCT VFR BLD AUTO: 33.5 % (ref 37–48.5)
HDLC SERPL-MCNC: 70 MG/DL (ref 40–75)
HDLC SERPL: 47.3 % (ref 20–50)
HGB BLD-MCNC: 9.8 G/DL (ref 12–16)
IMM GRANULOCYTES # BLD AUTO: 0.01 K/UL (ref 0–0.04)
IMM GRANULOCYTES NFR BLD AUTO: 0.2 % (ref 0–0.5)
IRON SERPL-MCNC: 21 UG/DL (ref 30–160)
LDLC SERPL CALC-MCNC: 72.8 MG/DL (ref 63–159)
LYMPHOCYTES # BLD AUTO: 1.3 K/UL (ref 1–4.8)
LYMPHOCYTES NFR BLD: 20.6 % (ref 18–48)
MCH RBC QN AUTO: 21.4 PG (ref 27–31)
MCHC RBC AUTO-ENTMCNC: 29.3 G/DL (ref 32–36)
MCV RBC AUTO: 73 FL (ref 82–98)
MONOCYTES # BLD AUTO: 0.5 K/UL (ref 0.3–1)
MONOCYTES NFR BLD: 6.9 % (ref 4–15)
NEUTROPHILS # BLD AUTO: 4.6 K/UL (ref 1.8–7.7)
NEUTROPHILS NFR BLD: 70.5 % (ref 38–73)
NONHDLC SERPL-MCNC: 78 MG/DL
NRBC BLD-RTO: 0 /100 WBC
PLATELET # BLD AUTO: 284 K/UL (ref 150–450)
PMV BLD AUTO: 11.8 FL (ref 9.2–12.9)
POTASSIUM SERPL-SCNC: 4.2 MMOL/L (ref 3.5–5.1)
PROT SERPL-MCNC: 7.1 G/DL (ref 6–8.4)
RBC # BLD AUTO: 4.58 M/UL (ref 4–5.4)
SATURATED IRON: 4 % (ref 20–50)
SODIUM SERPL-SCNC: 137 MMOL/L (ref 136–145)
TOTAL IRON BINDING CAPACITY: 531 UG/DL (ref 250–450)
TRANSFERRIN SERPL-MCNC: 359 MG/DL (ref 200–375)
TRIGL SERPL-MCNC: 26 MG/DL (ref 30–150)
WBC # BLD AUTO: 6.52 K/UL (ref 3.9–12.7)

## 2025-01-31 PROCEDURE — 1160F RVW MEDS BY RX/DR IN RCRD: CPT | Mod: CPTII,S$GLB,, | Performed by: FAMILY MEDICINE

## 2025-01-31 PROCEDURE — 3008F BODY MASS INDEX DOCD: CPT | Mod: CPTII,S$GLB,, | Performed by: FAMILY MEDICINE

## 2025-01-31 PROCEDURE — 90656 IIV3 VACC NO PRSV 0.5 ML IM: CPT | Mod: S$GLB,,, | Performed by: FAMILY MEDICINE

## 2025-01-31 PROCEDURE — 83540 ASSAY OF IRON: CPT | Performed by: FAMILY MEDICINE

## 2025-01-31 PROCEDURE — 3074F SYST BP LT 130 MM HG: CPT | Mod: CPTII,S$GLB,, | Performed by: FAMILY MEDICINE

## 2025-01-31 PROCEDURE — 85025 COMPLETE CBC W/AUTO DIFF WBC: CPT | Performed by: FAMILY MEDICINE

## 2025-01-31 PROCEDURE — 99395 PREV VISIT EST AGE 18-39: CPT | Mod: 25,S$GLB,, | Performed by: FAMILY MEDICINE

## 2025-01-31 PROCEDURE — 80053 COMPREHEN METABOLIC PANEL: CPT | Performed by: FAMILY MEDICINE

## 2025-01-31 PROCEDURE — 1159F MED LIST DOCD IN RCRD: CPT | Mod: CPTII,S$GLB,, | Performed by: FAMILY MEDICINE

## 2025-01-31 PROCEDURE — 3078F DIAST BP <80 MM HG: CPT | Mod: CPTII,S$GLB,, | Performed by: FAMILY MEDICINE

## 2025-01-31 PROCEDURE — 83036 HEMOGLOBIN GLYCOSYLATED A1C: CPT | Performed by: FAMILY MEDICINE

## 2025-01-31 PROCEDURE — 36415 COLL VENOUS BLD VENIPUNCTURE: CPT | Mod: PN | Performed by: FAMILY MEDICINE

## 2025-01-31 PROCEDURE — 90471 IMMUNIZATION ADMIN: CPT | Mod: S$GLB,,, | Performed by: FAMILY MEDICINE

## 2025-01-31 PROCEDURE — 80061 LIPID PANEL: CPT | Performed by: FAMILY MEDICINE

## 2025-01-31 PROCEDURE — 82728 ASSAY OF FERRITIN: CPT | Performed by: FAMILY MEDICINE

## 2025-01-31 PROCEDURE — 99999 PR PBB SHADOW E&M-EST. PATIENT-LVL III: CPT | Mod: PBBFAC,,, | Performed by: FAMILY MEDICINE

## 2025-01-31 RX ORDER — FLUTICASONE PROPIONATE 50 MCG
2 SPRAY, SUSPENSION (ML) NASAL DAILY
Qty: 16 ML | Refills: 11 | Status: SHIPPED | OUTPATIENT
Start: 2025-01-31

## 2025-01-31 RX ORDER — TRIAMCINOLONE ACETONIDE 1 MG/G
OINTMENT TOPICAL 2 TIMES DAILY
Qty: 30 G | Refills: 5 | Status: SHIPPED | OUTPATIENT
Start: 2025-01-31 | End: 2025-02-05

## 2025-01-31 RX ORDER — ALBUTEROL SULFATE 90 UG/1
2 INHALANT RESPIRATORY (INHALATION) EVERY 4 HOURS PRN
Qty: 18 G | Refills: 11 | Status: SHIPPED | OUTPATIENT
Start: 2025-01-31

## 2025-01-31 NOTE — PROGRESS NOTES
"  Patient Name: Cuong Aguayo    : 1995  MRN: [id number]      Subjective:     Patient ID: Cuong is a 29 y.o. female    Chief Complaint:  Annual Exam    History of Present Illness    Cuong presents today for annual checkup    She currently takes Albuterol inhaler as needed, Flonase, Lidocaine, and Triamcinolone. She reports avoiding vulvodynia cream due to significant drowsiness.    Last menstrual period was  with duration of 4-7 days.    She will be due for Pap smear this year.      She agrees to receive flu vaccination today.      Review of Systems   Constitutional:  Negative for activity change and unexpected weight change.   HENT:  Negative for hearing loss, rhinorrhea and trouble swallowing.    Eyes:  Negative for discharge and visual disturbance.   Respiratory:  Negative for chest tightness and wheezing.    Cardiovascular:  Negative for chest pain and palpitations.   Gastrointestinal:  Negative for blood in stool, constipation, diarrhea and vomiting.   Endocrine: Negative for polydipsia and polyuria.   Genitourinary:  Positive for menstrual problem. Negative for difficulty urinating, dysuria and hematuria.   Musculoskeletal:  Negative for arthralgias, joint swelling and neck pain.   Neurological:  Negative for weakness and headaches.   Psychiatric/Behavioral:  Negative for confusion and dysphoric mood.         Objective:   /70 (BP Location: Right arm, Patient Position: Sitting)   Pulse 60   Temp 98 °F (36.7 °C) (Oral)   Resp 19   Ht 5' 4" (1.626 m)   Wt 77.2 kg (170 lb 3.1 oz)   LMP 2025   SpO2 99%   BMI 29.21 kg/m²     Physical Exam  Vitals reviewed.   Constitutional:       General: She is not in acute distress.  HENT:      Head: Normocephalic and atraumatic.      Right Ear: Ear canal and external ear normal.      Left Ear: Ear canal and external ear normal.      Nose: Nose normal.      Mouth/Throat:      Mouth: Mucous membranes are moist.      Pharynx: No oropharyngeal " exudate or posterior oropharyngeal erythema.   Eyes:      Extraocular Movements: Extraocular movements intact.      Conjunctiva/sclera: Conjunctivae normal.      Pupils: Pupils are equal, round, and reactive to light.   Cardiovascular:      Rate and Rhythm: Normal rate and regular rhythm.      Pulses: Normal pulses.      Heart sounds: Normal heart sounds.   Pulmonary:      Effort: Pulmonary effort is normal. No respiratory distress.      Breath sounds: No wheezing or rales.   Abdominal:      General: Abdomen is flat. Bowel sounds are normal. There is no distension.      Palpations: Abdomen is soft.      Tenderness: There is no abdominal tenderness. There is no guarding.   Musculoskeletal:      Cervical back: Normal range of motion. No rigidity or tenderness.   Lymphadenopathy:      Cervical: No cervical adenopathy.   Skin:     General: Skin is warm.      Capillary Refill: Capillary refill takes less than 2 seconds.   Neurological:      Mental Status: She is alert and oriented to person, place, and time.      Cranial Nerves: No cranial nerve deficit.      Sensory: No sensory deficit.   Psychiatric:         Mood and Affect: Mood normal.         Behavior: Behavior normal.        Assessment        ICD-10-CM ICD-9-CM   1. Annual physical exam  Z00.00 V70.0   2. Iron deficiency anemia due to chronic blood loss  D50.0 280.0   3. Need for vaccination  Z23 V05.9   4. Pneumococcal vaccination declined  Z28.21 V64.06   5. Flexural eczema  L20.82 691.8   6. Exercise-induced asthma  J45.990 493.81   7. Hyperglycemia  R73.9 790.29   8. Encounter for lipid screening for cardiovascular disease  Z13.220 V77.91    Z13.6 V81.2         Plan:   Assessment & Plan      FOLLOW UP:  Scheduled follow up in 1 year for annual checkup.  Will communicate lab results via electronic messaging system.  Inquired about hematuria, melena, and lower extremity edema.           1. Annual physical exam  -     Comprehensive Metabolic Panel; Future;  Expected date: 01/31/2025  -     CBC Auto Differential; Future; Expected date: 01/31/2025  -     Iron and TIBC; Future; Expected date: 01/31/2025  -     Ferritin; Future; Expected date: 01/31/2025  -     Lipid Panel; Future; Expected date: 01/31/2025  -     Hemoglobin A1C; Future; Expected date: 01/31/2025  Age appropriate screenings, vaccinations, and recommendations reviewed and discussed.  Appropriate orders placed and previous results reviewed.    2. Iron deficiency anemia due to chronic blood loss  -     CBC Auto Differential; Future; Expected date: 01/31/2025  -     Iron and TIBC; Future; Expected date: 01/31/2025  -     Ferritin; Future; Expected date: 01/31/2025  Continue monitoring iron levels.      3. Need for vaccination  -     influenza (Flulaval, Fluzone, Fluarix) 45 mcg/0.5 mL IM vaccine (> or = 6 mo) 0.5 mL  Flu vaccination completed today.      4. Pneumococcal vaccination declined  Patient declined pneumococcal vaccination for now.    5. Flexural eczema  -     triamcinolone acetonide 0.1% (KENALOG) 0.1 % ointment; Apply topically 2 (two) times daily. To affected areas for 5 days  Dispense: 30 g; Refill: 5  Reports she does well with triamcinolone ointment.    Refill sent.    6. Exercise-induced asthma  -     fluticasone propionate (FLONASE) 50 mcg/actuation nasal spray; 2 sprays (100 mcg total) by Each Nostril route once daily.  Dispense: 16 mL; Refill: 11  -     albuterol (PROAIR HFA) 90 mcg/actuation inhaler; Inhale 2 puffs into the lungs every 4 (four) hours as needed for Shortness of Breath. Rescue  Dispense: 18 g; Refill: 11  Patient's asthma regimen refilled.      7. Hyperglycemia  -     Hemoglobin A1C; Future; Expected date: 01/31/2025  Previous labs had shown hyperglycemia.    Will check A1c level.      8. Encounter for lipid screening for cardiovascular disease  -     Lipid Panel; Future; Expected date: 01/31/2025  Screening lipid panel ordered as per USPSTF guidelines.             -Pedro LEHMAN  Jr. Mcdonough MD, AAHIVS      This note was generated with the assistance of ambient listening technology. Verbal consent was obtained by the patient and accompanying visitor(s) for the recording of patient appointment to facilitate this note. I attest to having reviewed and edited the generated note for accuracy, though some syntax or spelling errors may persist. Please contact the author of this note for any clarification.      There are no Patient Instructions on file for this visit.      Follow up in about 1 year (around 1/31/2026) for Annual.   No future appointments.

## 2025-01-31 NOTE — PROGRESS NOTES
Verified patient's name, , and allergies. Patient given flu vaccine to right deltoid, no complaints or reactions noted. Vis given 21 to patient. Instructed to wait in clinic for 15 minutes to note for reactions, verbalized understanding.

## 2025-02-01 LAB
ESTIMATED AVG GLUCOSE: 108 MG/DL (ref 68–131)
HBA1C MFR BLD: 5.4 % (ref 4–5.6)